# Patient Record
Sex: FEMALE | Race: WHITE | NOT HISPANIC OR LATINO | URBAN - METROPOLITAN AREA
[De-identification: names, ages, dates, MRNs, and addresses within clinical notes are randomized per-mention and may not be internally consistent; named-entity substitution may affect disease eponyms.]

---

## 2017-10-04 ENCOUNTER — OUTPATIENT (OUTPATIENT)
Dept: OUTPATIENT SERVICES | Facility: HOSPITAL | Age: 52
LOS: 1 days | End: 2017-10-04
Payer: COMMERCIAL

## 2017-10-04 ENCOUNTER — APPOINTMENT (OUTPATIENT)
Dept: MAMMOGRAPHY | Facility: IMAGING CENTER | Age: 52
End: 2017-10-04
Payer: COMMERCIAL

## 2017-10-04 DIAGNOSIS — Z00.8 ENCOUNTER FOR OTHER GENERAL EXAMINATION: ICD-10-CM

## 2017-10-04 DIAGNOSIS — Z90.710 ACQUIRED ABSENCE OF BOTH CERVIX AND UTERUS: Chronic | ICD-10-CM

## 2017-10-04 PROCEDURE — 77067 SCR MAMMO BI INCL CAD: CPT

## 2017-10-04 PROCEDURE — 77063 BREAST TOMOSYNTHESIS BI: CPT

## 2017-10-04 PROCEDURE — G0202: CPT | Mod: 26

## 2017-10-04 PROCEDURE — 77063 BREAST TOMOSYNTHESIS BI: CPT | Mod: 26

## 2019-07-08 ENCOUNTER — OUTPATIENT (OUTPATIENT)
Dept: OUTPATIENT SERVICES | Facility: HOSPITAL | Age: 54
LOS: 1 days | End: 2019-07-08
Payer: COMMERCIAL

## 2019-07-08 ENCOUNTER — APPOINTMENT (OUTPATIENT)
Dept: ULTRASOUND IMAGING | Facility: IMAGING CENTER | Age: 54
End: 2019-07-08
Payer: COMMERCIAL

## 2019-07-08 ENCOUNTER — APPOINTMENT (OUTPATIENT)
Dept: RADIOLOGY | Facility: IMAGING CENTER | Age: 54
End: 2019-07-08
Payer: COMMERCIAL

## 2019-07-08 ENCOUNTER — APPOINTMENT (OUTPATIENT)
Dept: MAMMOGRAPHY | Facility: IMAGING CENTER | Age: 54
End: 2019-07-08
Payer: COMMERCIAL

## 2019-07-08 DIAGNOSIS — Z90.710 ACQUIRED ABSENCE OF BOTH CERVIX AND UTERUS: Chronic | ICD-10-CM

## 2019-07-08 DIAGNOSIS — Z00.8 ENCOUNTER FOR OTHER GENERAL EXAMINATION: ICD-10-CM

## 2019-07-08 PROCEDURE — 76536 US EXAM OF HEAD AND NECK: CPT | Mod: 26

## 2019-07-08 PROCEDURE — 77063 BREAST TOMOSYNTHESIS BI: CPT

## 2019-07-08 PROCEDURE — 77063 BREAST TOMOSYNTHESIS BI: CPT | Mod: 26

## 2019-07-08 PROCEDURE — 77080 DXA BONE DENSITY AXIAL: CPT | Mod: 26

## 2019-07-08 PROCEDURE — 77067 SCR MAMMO BI INCL CAD: CPT | Mod: 26

## 2019-07-08 PROCEDURE — 77080 DXA BONE DENSITY AXIAL: CPT

## 2019-07-08 PROCEDURE — 77067 SCR MAMMO BI INCL CAD: CPT

## 2019-07-08 PROCEDURE — 76536 US EXAM OF HEAD AND NECK: CPT

## 2020-04-29 ENCOUNTER — INPATIENT (INPATIENT)
Facility: HOSPITAL | Age: 55
LOS: 4 days | Discharge: HOME | End: 2020-05-04
Attending: HOSPITALIST
Payer: COMMERCIAL

## 2020-04-29 VITALS
TEMPERATURE: 99 F | SYSTOLIC BLOOD PRESSURE: 147 MMHG | RESPIRATION RATE: 19 BRPM | DIASTOLIC BLOOD PRESSURE: 79 MMHG | HEART RATE: 119 BPM | OXYGEN SATURATION: 98 %

## 2020-04-29 DIAGNOSIS — F29 UNSPECIFIED PSYCHOSIS NOT DUE TO A SUBSTANCE OR KNOWN PHYSIOLOGICAL CONDITION: ICD-10-CM

## 2020-04-29 DIAGNOSIS — N39.0 URINARY TRACT INFECTION, SITE NOT SPECIFIED: ICD-10-CM

## 2020-04-29 DIAGNOSIS — E11.9 TYPE 2 DIABETES MELLITUS WITHOUT COMPLICATIONS: ICD-10-CM

## 2020-04-29 DIAGNOSIS — Z90.710 ACQUIRED ABSENCE OF BOTH CERVIX AND UTERUS: Chronic | ICD-10-CM

## 2020-04-29 DIAGNOSIS — Z02.9 ENCOUNTER FOR ADMINISTRATIVE EXAMINATIONS, UNSPECIFIED: ICD-10-CM

## 2020-04-29 DIAGNOSIS — E78.5 HYPERLIPIDEMIA, UNSPECIFIED: ICD-10-CM

## 2020-04-29 DIAGNOSIS — N17.9 ACUTE KIDNEY FAILURE, UNSPECIFIED: ICD-10-CM

## 2020-04-29 DIAGNOSIS — I10 ESSENTIAL (PRIMARY) HYPERTENSION: ICD-10-CM

## 2020-04-29 DIAGNOSIS — Z29.9 ENCOUNTER FOR PROPHYLACTIC MEASURES, UNSPECIFIED: ICD-10-CM

## 2020-04-29 LAB
ALBUMIN SERPL ELPH-MCNC: 4.7 G/DL — SIGNIFICANT CHANGE UP (ref 3.3–5)
ALP SERPL-CCNC: 65 U/L — SIGNIFICANT CHANGE UP (ref 40–120)
ALT FLD-CCNC: 35 U/L — HIGH (ref 4–33)
AMPHET UR-MCNC: NEGATIVE — SIGNIFICANT CHANGE UP
ANION GAP SERPL CALC-SCNC: 18 MMO/L — HIGH (ref 7–14)
APAP SERPL-MCNC: < 15 UG/ML — LOW (ref 15–25)
APPEARANCE UR: CLEAR — SIGNIFICANT CHANGE UP
AST SERPL-CCNC: 24 U/L — SIGNIFICANT CHANGE UP (ref 4–32)
BACTERIA # UR AUTO: SIGNIFICANT CHANGE UP
BARBITURATES UR SCN-MCNC: NEGATIVE — SIGNIFICANT CHANGE UP
BASOPHILS # BLD AUTO: 0.12 K/UL — SIGNIFICANT CHANGE UP (ref 0–0.2)
BASOPHILS NFR BLD AUTO: 0.7 % — SIGNIFICANT CHANGE UP (ref 0–2)
BENZODIAZ UR-MCNC: NEGATIVE — SIGNIFICANT CHANGE UP
BILIRUB SERPL-MCNC: 0.6 MG/DL — SIGNIFICANT CHANGE UP (ref 0.2–1.2)
BILIRUB UR-MCNC: NEGATIVE — SIGNIFICANT CHANGE UP
BLOOD UR QL VISUAL: NEGATIVE — SIGNIFICANT CHANGE UP
BUN SERPL-MCNC: 30 MG/DL — HIGH (ref 7–23)
CALCIUM SERPL-MCNC: 10.5 MG/DL — SIGNIFICANT CHANGE UP (ref 8.4–10.5)
CANNABINOIDS UR-MCNC: NEGATIVE — SIGNIFICANT CHANGE UP
CHLORIDE SERPL-SCNC: 103 MMOL/L — SIGNIFICANT CHANGE UP (ref 98–107)
CO2 SERPL-SCNC: 22 MMOL/L — SIGNIFICANT CHANGE UP (ref 22–31)
COCAINE METAB.OTHER UR-MCNC: NEGATIVE — SIGNIFICANT CHANGE UP
COLOR SPEC: YELLOW — SIGNIFICANT CHANGE UP
CREAT SERPL-MCNC: 1.13 MG/DL — SIGNIFICANT CHANGE UP (ref 0.5–1.3)
EOSINOPHIL # BLD AUTO: 0.14 K/UL — SIGNIFICANT CHANGE UP (ref 0–0.5)
EOSINOPHIL NFR BLD AUTO: 0.8 % — SIGNIFICANT CHANGE UP (ref 0–6)
ETHANOL BLD-MCNC: < 10 MG/DL — SIGNIFICANT CHANGE UP
GLUCOSE SERPL-MCNC: 129 MG/DL — HIGH (ref 70–99)
GLUCOSE UR-MCNC: NEGATIVE — SIGNIFICANT CHANGE UP
HCT VFR BLD CALC: 40.9 % — SIGNIFICANT CHANGE UP (ref 34.5–45)
HGB BLD-MCNC: 13.5 G/DL — SIGNIFICANT CHANGE UP (ref 11.5–15.5)
HYALINE CASTS # UR AUTO: HIGH
IMM GRANULOCYTES NFR BLD AUTO: 0.6 % — SIGNIFICANT CHANGE UP (ref 0–1.5)
KETONES UR-MCNC: NEGATIVE — SIGNIFICANT CHANGE UP
LEUKOCYTE ESTERASE UR-ACNC: SIGNIFICANT CHANGE UP
LYMPHOCYTES # BLD AUTO: 16.7 % — SIGNIFICANT CHANGE UP (ref 13–44)
LYMPHOCYTES # BLD AUTO: 2.8 K/UL — SIGNIFICANT CHANGE UP (ref 1–3.3)
MCHC RBC-ENTMCNC: 29.5 PG — SIGNIFICANT CHANGE UP (ref 27–34)
MCHC RBC-ENTMCNC: 33 % — SIGNIFICANT CHANGE UP (ref 32–36)
MCV RBC AUTO: 89.5 FL — SIGNIFICANT CHANGE UP (ref 80–100)
METHADONE UR-MCNC: NEGATIVE — SIGNIFICANT CHANGE UP
MONOCYTES # BLD AUTO: 1.57 K/UL — HIGH (ref 0–0.9)
MONOCYTES NFR BLD AUTO: 9.3 % — SIGNIFICANT CHANGE UP (ref 2–14)
NEUTROPHILS # BLD AUTO: 12.08 K/UL — HIGH (ref 1.8–7.4)
NEUTROPHILS NFR BLD AUTO: 71.9 % — SIGNIFICANT CHANGE UP (ref 43–77)
NITRITE UR-MCNC: NEGATIVE — SIGNIFICANT CHANGE UP
NRBC # FLD: 0 K/UL — SIGNIFICANT CHANGE UP (ref 0–0)
OPIATES UR-MCNC: NEGATIVE — SIGNIFICANT CHANGE UP
OXYCODONE UR-MCNC: NEGATIVE — SIGNIFICANT CHANGE UP
PCP UR-MCNC: NEGATIVE — SIGNIFICANT CHANGE UP
PH UR: 5.5 — SIGNIFICANT CHANGE UP (ref 5–8)
PLATELET # BLD AUTO: 365 K/UL — SIGNIFICANT CHANGE UP (ref 150–400)
PMV BLD: 9.2 FL — SIGNIFICANT CHANGE UP (ref 7–13)
POTASSIUM SERPL-MCNC: 3.4 MMOL/L — LOW (ref 3.5–5.3)
POTASSIUM SERPL-SCNC: 3.4 MMOL/L — LOW (ref 3.5–5.3)
PROT SERPL-MCNC: 8.2 G/DL — SIGNIFICANT CHANGE UP (ref 6–8.3)
PROT UR-MCNC: 50 — SIGNIFICANT CHANGE UP
RBC # BLD: 4.57 M/UL — SIGNIFICANT CHANGE UP (ref 3.8–5.2)
RBC # FLD: 13.5 % — SIGNIFICANT CHANGE UP (ref 10.3–14.5)
RBC CASTS # UR COMP ASSIST: SIGNIFICANT CHANGE UP (ref 0–?)
SALICYLATES SERPL-MCNC: < 5 MG/DL — LOW (ref 15–30)
SODIUM SERPL-SCNC: 143 MMOL/L — SIGNIFICANT CHANGE UP (ref 135–145)
SP GR SPEC: 1.03 — SIGNIFICANT CHANGE UP (ref 1–1.04)
SQUAMOUS # UR AUTO: SIGNIFICANT CHANGE UP
TSH SERPL-MCNC: 0.52 UIU/ML — SIGNIFICANT CHANGE UP (ref 0.27–4.2)
UROBILINOGEN FLD QL: NORMAL — SIGNIFICANT CHANGE UP
WBC # BLD: 16.81 K/UL — HIGH (ref 3.8–10.5)
WBC # FLD AUTO: 16.81 K/UL — HIGH (ref 3.8–10.5)
WBC UR QL: SIGNIFICANT CHANGE UP (ref 0–?)

## 2020-04-29 PROCEDURE — 99285 EMERGENCY DEPT VISIT HI MDM: CPT

## 2020-04-29 PROCEDURE — 70450 CT HEAD/BRAIN W/O DYE: CPT | Mod: 26

## 2020-04-29 PROCEDURE — 99233 SBSQ HOSP IP/OBS HIGH 50: CPT

## 2020-04-29 RX ORDER — DEXTROSE 50 % IN WATER 50 %
15 SYRINGE (ML) INTRAVENOUS ONCE
Refills: 0 | Status: DISCONTINUED | OUTPATIENT
Start: 2020-04-29 | End: 2020-05-04

## 2020-04-29 RX ORDER — AMLODIPINE BESYLATE 2.5 MG/1
1 TABLET ORAL
Qty: 0 | Refills: 0 | DISCHARGE

## 2020-04-29 RX ORDER — QUETIAPINE FUMARATE 200 MG/1
25 TABLET, FILM COATED ORAL AT BEDTIME
Refills: 0 | Status: DISCONTINUED | OUTPATIENT
Start: 2020-04-29 | End: 2020-04-30

## 2020-04-29 RX ORDER — METFORMIN HYDROCHLORIDE 850 MG/1
1 TABLET ORAL
Qty: 0 | Refills: 0 | DISCHARGE

## 2020-04-29 RX ORDER — GLUCAGON INJECTION, SOLUTION 0.5 MG/.1ML
1 INJECTION, SOLUTION SUBCUTANEOUS ONCE
Refills: 0 | Status: DISCONTINUED | OUTPATIENT
Start: 2020-04-29 | End: 2020-05-04

## 2020-04-29 RX ORDER — ATORVASTATIN CALCIUM 80 MG/1
10 TABLET, FILM COATED ORAL AT BEDTIME
Refills: 0 | Status: DISCONTINUED | OUTPATIENT
Start: 2020-04-29 | End: 2020-05-04

## 2020-04-29 RX ORDER — DEXTROSE 50 % IN WATER 50 %
25 SYRINGE (ML) INTRAVENOUS ONCE
Refills: 0 | Status: DISCONTINUED | OUTPATIENT
Start: 2020-04-29 | End: 2020-05-04

## 2020-04-29 RX ORDER — ATORVASTATIN CALCIUM 80 MG/1
10 TABLET, FILM COATED ORAL
Qty: 0 | Refills: 0 | DISCHARGE

## 2020-04-29 RX ORDER — SODIUM CHLORIDE 9 MG/ML
1000 INJECTION, SOLUTION INTRAVENOUS
Refills: 0 | Status: DISCONTINUED | OUTPATIENT
Start: 2020-04-29 | End: 2020-05-04

## 2020-04-29 RX ORDER — ENOXAPARIN SODIUM 100 MG/ML
40 INJECTION SUBCUTANEOUS DAILY
Refills: 0 | Status: DISCONTINUED | OUTPATIENT
Start: 2020-04-29 | End: 2020-05-04

## 2020-04-29 RX ORDER — DEXTROSE 50 % IN WATER 50 %
12.5 SYRINGE (ML) INTRAVENOUS ONCE
Refills: 0 | Status: DISCONTINUED | OUTPATIENT
Start: 2020-04-29 | End: 2020-05-04

## 2020-04-29 RX ORDER — INSULIN LISPRO 100/ML
VIAL (ML) SUBCUTANEOUS
Refills: 0 | Status: DISCONTINUED | OUTPATIENT
Start: 2020-04-29 | End: 2020-05-04

## 2020-04-29 RX ORDER — LOSARTAN POTASSIUM 100 MG/1
1 TABLET, FILM COATED ORAL
Qty: 0 | Refills: 0 | DISCHARGE

## 2020-04-29 RX ORDER — CEFTRIAXONE 500 MG/1
1000 INJECTION, POWDER, FOR SOLUTION INTRAMUSCULAR; INTRAVENOUS ONCE
Refills: 0 | Status: COMPLETED | OUTPATIENT
Start: 2020-04-29 | End: 2020-04-29

## 2020-04-29 RX ORDER — ACETAMINOPHEN 500 MG
650 TABLET ORAL EVERY 6 HOURS
Refills: 0 | Status: DISCONTINUED | OUTPATIENT
Start: 2020-04-29 | End: 2020-05-04

## 2020-04-29 RX ORDER — HALOPERIDOL DECANOATE 100 MG/ML
5 INJECTION INTRAMUSCULAR EVERY 6 HOURS
Refills: 0 | Status: DISCONTINUED | OUTPATIENT
Start: 2020-04-29 | End: 2020-05-04

## 2020-04-29 RX ORDER — CEFTRIAXONE 500 MG/1
1000 INJECTION, POWDER, FOR SOLUTION INTRAMUSCULAR; INTRAVENOUS ONCE
Refills: 0 | Status: COMPLETED | OUTPATIENT
Start: 2020-04-29 | End: 2020-04-30

## 2020-04-29 RX ADMIN — CEFTRIAXONE 100 MILLIGRAM(S): 500 INJECTION, POWDER, FOR SOLUTION INTRAMUSCULAR; INTRAVENOUS at 19:56

## 2020-04-29 RX ADMIN — Medication 2 MILLIGRAM(S): at 19:56

## 2020-04-29 RX ADMIN — Medication 30 MILLILITER(S): at 19:56

## 2020-04-29 RX ADMIN — Medication 650 MILLIGRAM(S): at 22:01

## 2020-04-29 RX ADMIN — Medication 2 MILLIGRAM(S): at 21:57

## 2020-04-29 RX ADMIN — HALOPERIDOL DECANOATE 5 MILLIGRAM(S): 100 INJECTION INTRAMUSCULAR at 22:01
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
## 2020-04-29 NOTE — H&P ADULT - NSHPLABSRESULTS_GEN_ALL_CORE
Personally reviewed labs.   Personally reviewed imaging.   Personally reviewed EKG.                           13.5   16.81 )-----------( 365      ( 2020 18:30 )             40.9           143  |  103  |  30<H>  ----------------------------<  129<H>  3.4<L>   |  22  |  1.13    Ca    10.5      2020 18:30    TPro  8.2  /  Alb  4.7  /  TBili  0.6  /  DBili  x   /  AST  24  /  ALT  35<H>  /  AlkPhos  65              LIVER FUNCTIONS - ( 2020 18:30 )  Alb: 4.7 g/dL / Pro: 8.2 g/dL / ALK PHOS: 65 u/L / ALT: 35 u/L / AST: 24 u/L / GGT: x                 Urinalysis Basic - ( 2020 18:30 )    Color: YELLOW / Appearance: CLEAR / S.028 / pH: 5.5  Gluc: NEGATIVE / Ketone: NEGATIVE  / Bili: NEGATIVE / Urobili: NORMAL   Blood: NEGATIVE / Protein: 50 / Nitrite: NEGATIVE   Leuk Esterase: LARGE / RBC: 0-2 / WBC 25-50   Sq Epi: FEW / Non Sq Epi: x / Bacteria: FEW Personally reviewed labs.   Personally reviewed imaging.   Personally reviewed EKG.     EKG                        13.5   16.81 )-----------( 365      ( 2020 18:30 )             40.9           143  |  103  |  30<H>  ----------------------------<  129<H>  3.4<L>   |  22  |  1.13    Ca    10.5      2020 18:30    TPro  8.2  /  Alb  4.7  /  TBili  0.6  /  DBili  x   /  AST  24  /  ALT  35<H>  /  AlkPhos  65              LIVER FUNCTIONS - ( 2020 18:30 )  Alb: 4.7 g/dL / Pro: 8.2 g/dL / ALK PHOS: 65 u/L / ALT: 35 u/L / AST: 24 u/L / GGT: x                 Urinalysis Basic - ( 2020 18:30 )    Color: YELLOW / Appearance: CLEAR / S.028 / pH: 5.5  Gluc: NEGATIVE / Ketone: NEGATIVE  / Bili: NEGATIVE / Urobili: NORMAL   Blood: NEGATIVE / Protein: 50 / Nitrite: NEGATIVE   Leuk Esterase: LARGE / RBC: 0-2 / WBC 25-50   Sq Epi: FEW / Non Sq Epi: x / Bacteria: FEW Personally reviewed labs.   Personally reviewed imaging.   Personally reviewed EKG.     EKG: Sinus tachycardia 119,                         13.5   16.81 )-----------( 365      ( 2020 18:30 )             40.9           143  |  103  |  30<H>  ----------------------------<  129<H>  3.4<L>   |  22  |  1.13    Ca    10.5      2020 18:30    TPro  8.2  /  Alb  4.7  /  TBili  0.6  /  DBili  x   /  AST  24  /  ALT  35<H>  /  AlkPhos  65              LIVER FUNCTIONS - ( 2020 18:30 )  Alb: 4.7 g/dL / Pro: 8.2 g/dL / ALK PHOS: 65 u/L / ALT: 35 u/L / AST: 24 u/L / GGT: x             Urinalysis Basic - ( 2020 18:30 )    Color: YELLOW / Appearance: CLEAR / S.028 / pH: 5.5  Gluc: NEGATIVE / Ketone: NEGATIVE  / Bili: NEGATIVE / Urobili: NORMAL   Blood: NEGATIVE / Protein: 50 / Nitrite: NEGATIVE   Leuk Esterase: LARGE / RBC: 0-2 / WBC 25-50   Sq Epi: FEW / Non Sq Epi: x / Bacteria: FEW  < from: CT Head No Cont (20 @ 20:45) >      EXAM:  CT BRAIN        PROCEDURE DATE:  2020     IMPRESSION:     No acute intracranial bleeding, mass effect, or shift.        < end of copied text > Personally reviewed labs.   Personally reviewed imaging.   Personally reviewed EKG.     EKG: Sinus tachycardia 119,                         13.5   16.81 )-----------( 365      ( 2020 18:30 )             40.9           143  |  103  |  30<H>  ----------------------------<  129<H>  3.4<L>   |  22  |  1.13    Ca    10.5      2020 18:30    TPro  8.2  /  Alb  4.7  /  TBili  0.6  /  DBili  x   /  AST  24  /  ALT  35<H>  /  AlkPhos  65          LIVER FUNCTIONS - ( 2020 18:30 )  Alb: 4.7 g/dL / Pro: 8.2 g/dL / ALK PHOS: 65 u/L / ALT: 35 u/L / AST: 24 u/L / GGT: x             Urinalysis Basic - ( 2020 18:30 )    Color: YELLOW / Appearance: CLEAR / S.028 / pH: 5.5  Gluc: NEGATIVE / Ketone: NEGATIVE  / Bili: NEGATIVE / Urobili: NORMAL   Blood: NEGATIVE / Protein: 50 / Nitrite: NEGATIVE   Leuk Esterase: LARGE / RBC: 0-2 / WBC 25-50   Sq Epi: FEW / Non Sq Epi: x / Bacteria: FEW  < from: CT Head No Cont (20 @ 20:45) >      EXAM:  CT BRAIN        PROCEDURE DATE:  2020     IMPRESSION:     No acute intracranial bleeding, mass effect, or shift.        < end of copied text >

## 2020-04-29 NOTE — ED BEHAVIORAL HEALTH ASSESSMENT NOTE - RISK ASSESSMENT
Acute risk factors: mood episode, agitation, insomnia, aggressive behavior, impulsivity, recent discontinuation of medication  Chronic risk: prior psych hx, non-compliance/not in treatment, lacks insight, poor judgment  Protective: social supports, no prior history of suicide attempts or violence, no legal history, no substance abuse, , female gender Low Acute Suicide Risk
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
## 2020-04-29 NOTE — H&P ADULT - PROBLEM SELECTOR PLAN 5
On metformin 500 daily at home  ISS while hospitalized  A1C -c/w Amlodipine  -hold losartan in setting of DALJIT
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
## 2020-04-29 NOTE — ED ADULT NURSE NOTE - ED STAT RN HANDOFF DETAILS
endorsed to essu 1 argenis banda. pt awake, alert, verbal and responsive to all stimuli. pt currently at ct scan with 1:1 in place. nad noted.
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
## 2020-04-29 NOTE — H&P ADULT - NSHPPHYSICALEXAM_GEN_ALL_CORE
Vital Signs Last 24 Hrs  T(C): 37 (29 Apr 2020 18:06), Max: 37 (29 Apr 2020 18:06)  T(F): 98.6 (29 Apr 2020 18:06), Max: 98.6 (29 Apr 2020 18:06)  HR: 119 (29 Apr 2020 18:06) (119 - 119)  BP: 147/79 (29 Apr 2020 18:06) (147/79 - 147/79)  BP(mean): --  RR: 19 (29 Apr 2020 18:06) (19 - 19)  SpO2: 98% (29 Apr 2020 18:06) (98% - 98%) Vital Signs Last 24 Hrs  T(C): 37 (29 Apr 2020 18:06), Max: 37 (29 Apr 2020 18:06)  T(F): 98.6 (29 Apr 2020 18:06), Max: 98.6 (29 Apr 2020 18:06)  HR: 119 (29 Apr 2020 18:06) (119 - 119)  BP: 147/79 (29 Apr 2020 18:06) (147/79 - 147/79)  BP(mean): --  RR: 19 (29 Apr 2020 18:06) (19 - 19)  SpO2: 98% (29 Apr 2020 18:06) (98% - 98%)    PHYSICAL EXAM:  GENERAL: NAD, well-developed  HEAD: Atraumatic, Normocephalic  EYES: EOMI, PERRLA, conjunctiva and sclera clear  NECK: Supple, No JVD  CHEST/LUNG: Clear to auscultation bilaterally; No wheezes/rales/rhonchi  HEART: Tachycardic, regular rhythm; No murmurs, rubs, or gallops  ABDOMEN: Soft, Nontender, Nondistended; Bowel sounds present  EXTREMITIES:  2+ dP pulses b/l, No clubbing, cyanosis, or edema  PSYCH: reactive affect, cooperative, perseverates on not going to back to live with her family  NEUROLOGY: AAOx3, non-focal  SKIN: No rashes or lesions

## 2020-04-29 NOTE — ED PROVIDER NOTE - CLINICAL SUMMARY MEDICAL DECISION MAKING FREE TEXT BOX
56 y/o female pmhx of DM on Metformin, HTN, HLD, s/p hysterectomy, UTI induced psychosis, possible "major depressive disorder, generalized anxiety disorder," stopped Lexapro 1 month ago, hasn't seen therapist in 1 year, presents to ED for bizarre behavior x 3-4 weeks. Pt was tested for COVID antibody +. Daughter states she has had gradual onset of verbal aggression, and depression. States she is manic, took $50,000 out of her bank today and was planning on leaving to [place]. Pt has not been sleeping.  Talking a lot and gets confused and disorganized and anxious. pt calm and cooperative in ED, manic. plan for labs, tsh, ua and cx, tox screen, CT head and psych consult. Cornelius Russell DO: Patient seen during COVID19 pandemic. 54 yo female past medical history DM on Metformin, HTN, HLD, s/p hysterectomy, hx of UTI induced psychosis, possible "major depressive disorder, generalized anxiety disorder," presents to ED for bizarre behavior x 3-4 weeks. Pt was tested for COVID antibody +. stopped Lexapro 1 month ago, hasn't seen therapist in 1 year, Daughter ([facility] nurse) states she has had gradual onset of verbal aggression, and depression. States she is manic, took $50,000 out of her bank today and was planning on leaving to [place]. Pt has not been sleeping.  Talking a lot and gets confused and disorganized and anxious. In ED pt calm, but requiring frequent verbal redirection. exam as above. Ddx includes, but not limited to, new tina/psychosis, will assess for uti given hx. plan: labs, CT head and psych consult.

## 2020-04-29 NOTE — ED BEHAVIORAL HEALTH ASSESSMENT NOTE - VIOLENCE RISK FACTORS:
Affective dysregulation/Irritability/Elopement history or risk/Lack of insight into violence risk/need for treatment/Community stressors that increase the risk of destabilization/Impulsivity/Noncompliance with treatment

## 2020-04-29 NOTE — H&P ADULT - NSHPSOCIALHISTORY_GEN_ALL_CORE
Lives at home with  and two children Lives at home with  and two children  Non-smoker  No illicit drug use

## 2020-04-29 NOTE — H&P ADULT - PROBLEM SELECTOR PLAN 4
-c/w Amlodipine  -hold losartan in setting of DALJIT Patient with COVID symptoms 1 month ago, and positive antibody test 1 week ago  -f/u COVID PCR

## 2020-04-29 NOTE — H&P ADULT - PROBLEM SELECTOR PLAN 1
Patient with psychosis, multifactorial 2/2 psych med discontinuation, infection (UTI, ?COVID)  -CTH pending, Follow up   -TSH wnl   -UA concerning for UTI, UCx pending   -patient received 2mg Ativan in the ED  -Evaluated by psych, patient started on Ativan and Haldol PRN and Seroquel 25 qhs Patient with psychosis, multifactorial 2/2 psych med discontinuation, infection (UTI, ?COVID)  -CTH without acute changes   -TSH wnl , drug screen neg  -UA concerning for UTI, UCx pending   -patient received 2mg Ativan in the ED  -Evaluated by psych, patient started on Ativan and Haldol PRN and Seroquel 25 qhs  -F/ U psych recs Patient with psychosis, multifactorial 2/2 psych med discontinuation, infection (UTI, ?COVID)  -CTH without acute changes   -TSH wnl , drug screen neg  -UA concerning for UTI, UCx pending   -patient received 2mg Ativan in the ED  -Evaluated by psych, patient started on Ativan and Haldol PRN and Seroquel 25 qhs. QTc on admission 464 ms. Monitor QTc with serial EKGs.  -F/ U psych recs

## 2020-04-29 NOTE — ED BEHAVIORAL HEALTH ASSESSMENT NOTE - OTHER PAST PSYCHIATRIC HISTORY (INCLUDE DETAILS REGARDING ONSET, COURSE OF ILLNESS, INPATIENT/OUTPATIENT TREATMENT)
History of recurrent depression, SHANNON, hx one manic episode 1.5 years ago, no psychiatric hospitalization, hx UTI-related psychosis in 2016, prior to which had no psychiatric history. Was last in outpatient treatment over the summer at [facility] stopped in September, had been taking Lexapro up until 1 month ago

## 2020-04-29 NOTE — ED BEHAVIORAL HEALTH ASSESSMENT NOTE - SUICIDE RISK FACTORS
Agitation/Severe Anxiety/Panic/Unable to engage in safety planning/Insomnia/Psychotic disorder current/past/Mood Disorder current/past/Impulsivity/Current mood episode

## 2020-04-29 NOTE — ED BEHAVIORAL HEALTH ASSESSMENT NOTE - DESCRIPTION
HLD, HTN, pre-diabetes,osteopenia, hysterectomy born in [place], grew up in [place], graduated high school, some college,  thirty years, has one son and one daughter Arrived agitated, bit  on way over. Was restless and pacing but was not aggressive toward staff in ER.     Vital Signs Last 24 Hrs  T(C): 37 (29 Apr 2020 18:06), Max: 37 (29 Apr 2020 18:06)  T(F): 98.6 (29 Apr 2020 18:06), Max: 98.6 (29 Apr 2020 18:06)  HR: 119 (29 Apr 2020 18:06) (119 - 119)  BP: 147/79 (29 Apr 2020 18:06) (147/79 - 147/79)  BP(mean): --  RR: 19 (29 Apr 2020 18:06) (19 - 19)  SpO2: 98% (29 Apr 2020 18:06) (98% - 98%)
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
## 2020-04-29 NOTE — H&P ADULT - NSICDXPASTMEDICALHX_GEN_ALL_CORE_FT
PAST MEDICAL HISTORY:  Diabetes     SHANNON (generalized anxiety disorder)     Hyperlipemia     Hypertension     MDD (major depressive disorder)     Organic psychosis

## 2020-04-29 NOTE — ED ADULT NURSE NOTE - OBJECTIVE STATEMENT
Received pt to room 13 A&Ox4 noted to be hyperverbal on assessment but redirectable just keeps repeating "I just want an amicable divorce and half the money is mine". Denies drug or alcohol use, Denies SI/HI, denies medical complaints. Respirations even and unlabored, CO maintained for safety, will continue to monitor.

## 2020-04-29 NOTE — ED BEHAVIORAL HEALTH ASSESSMENT NOTE - SUMMARY
55 year old  female living with  and two children, prior psychiatric history of mood disorder, recurrent depression, one manic episode 1.5 years ago, history of UTI related psychosis in 2016, short courses of outpatient treatment, last saw a therapist at [facility] September 2019 while on Lexapro, recent COVID+ a month ago, was brought in by EMS Called by family for erratic behavior in setting of multiple stressors and stopping Lexapro a month ago.    Patient with increasingly erratic behavior at home including taking out $50,000 from bank with plan to run away. Stressors include pandemic, mother's worsening Alzheimer's dementia and family struggles in addition to discontinuation of medication. She is showing signs of mixed tina, agitation, aggression, impulsivity, insomnia, increase in goal-oriented behavior and is preoccupied with wanting to divorce . She is also disorganized and confused at times with poor attention/concentration and was found to have UTI- this could be similar to prior episode in 2016 of a delirium picture on top of underlying exacerbated psychiatric condition due to the UTI. Patient to be admitted to medicine for treatment of UTI and CL will follow, likely will require admission.

## 2020-04-29 NOTE — ED PROVIDER NOTE - CONSTITUTIONAL, MLM
normal... Well appearing, awake, alert, oriented to person, place, time/situation and in no apparent distress. Calm and cooperative.

## 2020-04-29 NOTE — H&P ADULT - NSHPSOURCEINFOTX_GEN_ALL_CORE
Luann Vu, at [phone] or cell #[phone]. Daughter is an RN on unit L4 at [employer].

## 2020-04-29 NOTE — H&P ADULT - PROBLEM SELECTOR PLAN 2
Pt with UA showing bacteria, and LE. Patient denied symptoms but also with leukocytosis. Also with hx of psychosis in the setting of UTI in the past, probable similar occurrence on this admission   -ceftriaxone in the ED, will continue to complete 5 day course  -f/u Ucx

## 2020-04-29 NOTE — H&P ADULT - PROBLEM SELECTOR PLAN 8
Transitions of Care Status:  1.  Name of PCP:  2.  PCP Contacted on Admission: [ ] Y    [ ] N    3.  PCP contacted at Discharge: [ ] Y    [ ] N    [ ] N/A  4.  Post-Discharge Appointment Date and Location:  5.  Summary of Handoff given to PCP: DVT ppx: Lovenox SQ  Diet: DASH/TLC/ CCH

## 2020-04-30 LAB
CULTURE RESULTS: SIGNIFICANT CHANGE UP
SARS-COV-2 RNA SPEC QL NAA+PROBE: DETECTED
SPECIMEN SOURCE: SIGNIFICANT CHANGE UP

## 2020-04-30 PROCEDURE — 99233 SBSQ HOSP IP/OBS HIGH 50: CPT

## 2020-04-30 RX ORDER — CEFTRIAXONE 500 MG/1
1000 INJECTION, POWDER, FOR SOLUTION INTRAMUSCULAR; INTRAVENOUS EVERY 24 HOURS
Refills: 0 | Status: DISCONTINUED | OUTPATIENT
Start: 2020-05-01 | End: 2020-05-01

## 2020-04-30 RX ORDER — QUETIAPINE FUMARATE 200 MG/1
50 TABLET, FILM COATED ORAL AT BEDTIME
Refills: 0 | Status: DISCONTINUED | OUTPATIENT
Start: 2020-04-30 | End: 2020-05-02

## 2020-04-30 RX ADMIN — ATORVASTATIN CALCIUM 10 MILLIGRAM(S): 80 TABLET, FILM COATED ORAL at 23:08

## 2020-04-30 RX ADMIN — QUETIAPINE FUMARATE 50 MILLIGRAM(S): 200 TABLET, FILM COATED ORAL at 23:08

## 2020-04-30 RX ADMIN — ENOXAPARIN SODIUM 40 MILLIGRAM(S): 100 INJECTION SUBCUTANEOUS at 19:00

## 2020-04-30 RX ADMIN — CEFTRIAXONE 100 MILLIGRAM(S): 500 INJECTION, POWDER, FOR SOLUTION INTRAMUSCULAR; INTRAVENOUS at 04:22

## 2020-04-30 NOTE — PROGRESS NOTE BEHAVIORAL HEALTH - PRN MEDS
Ativan 2mg IV, Haldol 5mg IM PRN for agitation, Ativan 2mg PO, Ativan 2mg IV, Haldol 5mg IM PRN for agitation

## 2020-04-30 NOTE — PROGRESS NOTE ADULT - PROBLEM SELECTOR PLAN 1
Patient with psychosis, multifactorial 2/2 psych med discontinuation, infection (UTI, ?COVID);  - CTH without acute changes   - TSH wnl, Tox Screen Negatve, f/u Vitamin B12 and Folate  - UA concerning for UTI, UCx pending   - Psych following appreciate recs.  - Seroquel 50 mg qhs  - Haldol and Ativan prn per psych
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
## 2020-04-30 NOTE — PROGRESS NOTE BEHAVIORAL HEALTH - SUMMARY
55 year old  female living with  and two children, prior psychiatric history of mood disorder, recurrent depression, one manic episode 1.5 years ago, history of UTI related psychosis in 2016, short courses of outpatient treatment, last saw a therapist at [facility] September 2019 while on Lexapro, recent COVID+ a month ago, was brought in by EMS Called by family for erratic behavior in setting of multiple stressors and stopping Lexapro a month ago.    Patient with increasingly erratic behavior at home including taking out $50,000 from bank with plan to run away. Stressors include pandemic, mother's worsening Alzheimer's dementia and family struggles in addition to discontinuation of medication. She is showing signs of mixed tina, agitation, aggression, impulsivity, insomnia, increase in goal-oriented behavior and is preoccupied with wanting to divorce . She is also disorganized and confused at times with poor attention/concentration and was found to have UTI- this could be similar to prior episode in 2016 of a delirium picture on top of underlying exacerbated psychiatric condition due to the UTI. Patient admitted to medicine for treatment of UTI, likely will require admission. 55 year old  female living with  and two children, prior psychiatric history of mood disorder, recurrent depression, one manic episode 1.5 years ago, history of UTI related psychosis in 2016, short courses of outpatient treatment, last saw a therapist at [facility] September 2019 while on Lexapro, recent COVID+ a month ago, was brought in by EMS Called by family for erratic behavior in setting of multiple stressors and stopping Lexapro a month ago.    Patient with increasingly erratic behavior at home including taking out $50,000 from bank with plan to run away. Stressors include pandemic, mother's worsening Alzheimer's dementia and family struggles in addition to discontinuation of medication. She is showing signs of mixed tina, agitation, aggression, impulsivity, insomnia, increase in goal-oriented behavior and is preoccupied with wanting to divorce . She is also disorganized and confused at times with poor attention/concentration and was found to have UTI- this could be similar to prior episode in 2016 of a delirium picture on top of underlying exacerbated psychiatric condition due to the UTI. Patient admitted to medicine for treatment of UTI. After receiving a total of 4mg Ativan and 5mg Haldol overnight, patient is more calm and composed, is able to have linear conversation and is no longer agitated. She seems to be minimizing some of her symptoms. Will likely benefit from inpatient psychiatric hospitalization 55 year old  female living with  and two children, prior psychiatric history of mood disorder, recurrent depression, one manic episode 1.5 years ago, history of UTI related psychosis in 2016, short courses of outpatient treatment, last saw a therapist at [facility] September 2019 while on Lexapro, recent COVID+ a month ago, was brought in by EMS Called by family for erratic behavior in setting of multiple stressors and stopping Lexapro a month ago.    Patient with increasingly erratic behavior at home including taking out $50,000 from bank with plan to run away. Upon initial presentation to ED, showed symptoms consistent with tina, agitation, aggression, impulsivity, insomnia, increase in goal-oriented behavior and is preoccupied with wanting to divorce . Patient was found to have UTI, and was admitted to medicine for treatment of UTI, now on IV antibiotics. Patient received a total of 4mg Ativan and 5mg Haldol overnight for agitation. Today, patient is more calm and composed, is able to have linear conversation and is no longer agitated. She is now more guarded and minimizing her symptoms. Patient has a history of presenting with manic symptoms in the setting of a UTI, which resolved after UTI resolved. This could be similar to prior episode in 2016 of a delirium picture on top of underlying exacerbated psychiatric condition due to the UTI. Patient will likely need psychiatric admission, however will continue to follow patient for now. 55 year old  female living with  and two children, prior psychiatric history of mood disorder, recurrent depression, one manic episode 1.5 years ago, history of UTI related psychosis in 2016, short courses of outpatient treatment, last saw a therapist at [facility] September 2019 while on Lexapro, recent COVID+ a month ago, was brought in by EMS Called by family for erratic behavior in setting of multiple stressors and stopping Lexapro a month ago.    Patient with increasingly erratic behavior at home including taking out $50,000 from bank with plan to run away. Upon initial presentation to ED, showed symptoms consistent with tina, agitation, aggression, impulsivity, insomnia, increase in goal-oriented behavior and is preoccupied with wanting to divorce . Patient was found to have UTI, and was admitted to medicine for treatment of UTI, now on IV antibiotics. Patient received a total of 4mg Ativan and 5mg Haldol overnight for agitation. Today, patient is more calm and composed, is able to have linear conversation and is no longer agitated. She is now more guarded and minimizing her symptoms. Patient has a history of presenting with manic symptoms in the setting of a UTI, which resolved after UTI resolved. This could be similar to prior episode in 2016 of a delirium picture on top of underlying exacerbated psychiatric condition due to the UTI. Patient will likely need psychiatric admission, however will continue to follow patient for now.    DDX; Delirium 2/2 UTI  vs psychosis unspecified vs Bipolar disorder current episode manic    PLAN:  See below  Patient cannot leave AMA.

## 2020-04-30 NOTE — PROGRESS NOTE BEHAVIORAL HEALTH - NSBHCONSULTMEDAGITATION_PSY_A_CORE FT
Haldol 5mg PO/IM/IV PRN q6h for agitation  Ativan 2mg PO/IM/IV q6h PRN for agitation/anxiety Haldol 5mg PO/IM/IV PRN q6h for agitation if qtc <500  Ativan 2mg PO/IM/IV q6h PRN for agitation/anxiety

## 2020-04-30 NOTE — PROGRESS NOTE BEHAVIORAL HEALTH - NSBHFUPINTERVALHXFT_PSY_A_CORE
Overnight, patient receiving Ativan 2mg PO, and later Ativan 2mg IV/haldol 5mg IM for agitation. Patient was started on seroquel 25mg qhs but she refused PO meds. She is currently being treated for UTI with ceftriaxone.     Patient evaluated at bedside this morning. She reports Overnight, patient receiving Ativan 2mg PO, and later Ativan 2mg IV/haldol 5mg IM for agitation. Patient was started on seroquel 25mg qhs but she refused PO meds. She is currently being treated for UTI with ceftriaxone.     Patient evaluated at bedside this morning. Patient is calmly sitting in bed with mask on, and is cooperative with interview. She is alert and orientedx3, understands she is in the hospital with an infection. Patient reports prior to coming to the hospital she had taken out $10,000 from [employer] and went to visit her mother who has Alzheimers and went grocery shopping. She came home, and her  called the police after an argument. Patient reports when the police arrived, she bit one of them, and was subsequently brought to [facility]. Regarding her psychiatric history, she states she used to have depression and saw an outpatient psychiatrist, was on Lexapro 5mg but was taken off in August 2019. She has not had outpatient psychiatric follow up since then. She denies ever having been psychiatrically hospitalized. She reports getting approximately 5-6 hours of sleep per night at home, denies anxiety, denies racing thoughts. She denies having grandiose thoughts of having special talents or super rondon. Denies AVH. She states she is not working, spends time writing in her journal and watching television. Patient reports living at home with her  and two children. She reports having martial issues recently, and wanting to get a divorce. She denies smoking cigarettes, no alcohol or drugs use. She reports sleeping well last night, and feels “very good about myself” this morning. Patient denies suicidal/homicidal ideation, no delusions elicited, no paranoia.   Patient evaluated at bedside this morning. She reports Overnight, patient receiving Ativan 2mg PO, and later Ativan 2mg IV/haldol 5mg IM for agitation. Patient was started on seroquel 25mg qhs but she refused PO meds. She is currently being treated for UTI with ceftriaxone.     Patient evaluated at bedside this morning by Dr. Simpson. Patient seen by myself via video (Zoom call) in order to mitigate infection risk due to COVID.  Patient is calmly sitting in bed with mask on, and is cooperative with interview but is guarded and minimizing symptoms. She is alert and orientedx3, understands she is in the hospital with an infection. Patient reports prior to coming to the hospital she had taken out $10,000 from [employer] and went to visit her mother who has Alzheimers and went grocery shopping. She came home, and her  called the police after an argument. Patient reports when the police arrived, she bit one of them, and was subsequently brought to [facility]. Regarding her psychiatric history, she states she used to have depression and saw an outpatient psychiatrist, was on Lexapro 5mg but was taken off in August 2019. She has not had outpatient psychiatric follow up since then. She denies ever having been psychiatrically hospitalized. She reports getting approximately 5-6 hours of sleep per night at home, denies anxiety, denies racing thoughts. She denies having grandiose thoughts of having special talents or super rondon. Denies AVH. She states she is not working, spends time writing in her journal and watching television. Patient reports living at home with her  and two children. She reports having martial issues recently, and wanting to get a divorce. She denies smoking cigarettes, no alcohol or drugs use. She reports sleeping well last night, and feels “very good about myself” this morning. Patient denies suicidal/homicidal ideation, no delusions elicited, no paranoia.     Collateral from daughter Luann Vu ([phone])  Daughter reports that patient has been decompensating mentally for the last month. She states she was initially very angry and verbally aggressive. Afterwards, she would have bouts of tearfulness and would frequently break down and cry. Over the last 1-1.5 weeks, patient has been presenting with more manic symptoms. She also reports patient was delusional, thinking nobody in the family wanted to help her, that the family was trying to take all of her money away. She would take cabs to different places, spent a lot of money on her credit card, and took $50,000 out of the bank to use to escape to [place] and start a new life. She had even prepared a go-bag to bring with her to [place], with all of her important account papers. The family called the police because when they found out about her intent to leave for [place]. When the police came, the patient tried to bite one of them. Daughter states the mother is at baseline a hyperverbal and excitable person with a bright personality who likes to stay active. She states 5-6 years ago, pt emptied the vault in their house and sold everything inside including family heirlooms, and planned to use the money to escape. She was brought to the hospital, was found to have a UTI, and at that time was started on Risperdal. Patient’s symptoms resolved after her UTI resolved, and patient was eventually taken off of Risperdal. Approximately 2 years ago, the patient was started on Lexapro by her PCP, after she had racked up a very large credit card bill – during this time, patient was more depressed. Daughter reports that since that time, the patient has had intermittent episodes of what she thinks was somewhat manic-like behavior, such as not sleeping well, wanting to leave and escape the home, staying up all night and cooking or cleaning, but reports the family was able to keep things under control. Patient has never had SI/HI, no prior SA, no history of aggression, no AVH. Overnight, patient receiving Ativan 2mg PO, and later Ativan 2mg IV/haldol 5mg IM for agitation. Patient was started on seroquel 25mg qhs but she refused PO meds. She is currently being treated for UTI with ceftriaxone.     Patient evaluated in peson at bedside this morning by Dr. Simpson. Patient seen by myself via video (Zoom call) in order to mitigate infection risk due to COVID.  Patient is calmly sitting in bed with mask on, and is cooperative with interview but is guarded and minimizing symptoms. She is alert and orientedx3, understands she is in the hospital. Patient reports prior to coming to the hospital she had taken out $10,000 from [employer] and went to visit her mother who has Alzheimers and went grocery shopping. She came home, and her  called the police after an argument. Patient reports when the police arrived, she bit one of them, and was subsequently brought to [facility]. Regarding her psychiatric history, she states she used to have depression and saw an outpatient psychiatrist, was on Lexapro 5mg but was taken off in August 2019. She has not had outpatient psychiatric follow up since then. She denies ever having been psychiatrically hospitalized. She reports getting approximately 5-6 hours of sleep per night at home, denies anxiety, denies racing thoughts. She denies having grandiose thoughts of having special talents or super rondon. Denies AVH. She states she is not working, spends time writing in her journal and watching television. Patient reports living at home with her  and two children. She reports having martial issues recently, and wanting to get a divorce. She denies smoking cigarettes, no alcohol or drugs use. She reports sleeping well last night, and feels “very good about myself” this morning. Patient denies suicidal/homicidal ideation, no delusions elicited, no paranoia.     Collateral from daughter Luann Vu ([phone]) (pt. gave consent)  Daughter reports that patient has been decompensating mentally for the last month. She states she was initially very angry and verbally aggressive. Afterwards, she would have bouts of tearfulness and would frequently break down and cry. Over the last 1-1.5 weeks, patient has been presenting with more manic symptoms. She also reports patient was delusional, thinking nobody in the family wanted to help her, that the family was trying to take all of her money away. She would take cabs to different places, spent a lot of money on her credit card, and took $50,000 out of the bank to use to escape to [place] and start a new life. She had even prepared a go-bag to bring with her to [place], with all of her important account papers. The family called the police because when they found out about her intent to leave for [place]. When the police came, the patient tried to bite one of them. Daughter states the mother is at baseline a hyperverbal and excitable person with a bright personality who likes to stay active. She states 5-6 years ago, pt emptied the vault in their house and sold everything inside including family heirlooms, and planned to use the money to escape. She was brought to the hospital, was found to have a UTI, and at that time was started on Risperdal. Patient’s symptoms resolved after her UTI resolved, and patient was eventually taken off of Risperdal. Approximately 2 years ago, the patient was started on Lexapro by her PCP, after she had racked up a very large credit card bill – during this time, patient was more depressed. Daughter reports that since that time, the patient has had intermittent episodes of what she thinks was somewhat manic-like behavior, such as not sleeping well, wanting to leave and escape the home, staying up all night and cooking or cleaning, but reports the family was able to keep things under control. Patient has never had SI/HI, no prior SA, no history of aggression, no AVH.

## 2020-04-30 NOTE — PROGRESS NOTE BEHAVIORAL HEALTH - NSBHCHARTREVIEWLAB_PSY_A_CORE FT
12.4   11.14 )-----------( 314      ( 2020 07:55 )             37.6         143  |  103  |  30<H>  ----------------------------<  129<H>  3.4<L>   |  22  |  1.13    Ca    10.5      2020 18:30    TPro  8.2  /  Alb  4.7  /  TBili  0.6  /  DBili  x   /  AST  24  /  ALT  35<H>  /  AlkPhos  65  29    Urinalysis Basic - ( 2020 18:30 )    Color: YELLOW / Appearance: CLEAR / S.028 / pH: 5.5  Gluc: NEGATIVE / Ketone: NEGATIVE  / Bili: NEGATIVE / Urobili: NORMAL   Blood: NEGATIVE / Protein: 50 / Nitrite: NEGATIVE   Leuk Esterase: LARGE / RBC: 0-2 / WBC 25-50   Sq Epi: FEW / Non Sq Epi: x / Bacteria: FEW      LIVER FUNCTIONS - ( 2020 18:30 )  Alb: 4.7 g/dL / Pro: 8.2 g/dL / ALK PHOS: 65 u/L / ALT: 35 u/L / AST: 24 u/L / GGT: x           TSH 0.52

## 2020-04-30 NOTE — PROGRESS NOTE BEHAVIORAL HEALTH - NSBHCONSULTMEDS_PSY_A_CORE FT
- INCOMPLETE-    - Given patient's current presentation of tina, start patient on mood stabilizer - Continue patient on Increase seroquel to 50mg PO qhs. If patient refuses PO Seroquel tonight, will consider switching to another IV antipsychotic tomorrow.

## 2020-04-30 NOTE — PROGRESS NOTE BEHAVIORAL HEALTH - OTHER
some psychomotor agitation, unable to sit still , keeps removing her mask pacing patient sitting in bed "very good" jose SI and HI, focused on her relationship with he

## 2020-04-30 NOTE — PROGRESS NOTE ADULT - SUBJECTIVE AND OBJECTIVE BOX
FLAVIA SNELL  55y  Female      Subjective:     Patient was admitted overnight and showed signs of agitation requiring Haldol and ativan.  This am, patient states that she feels better. She states that her fevers, anger, and agitation were in the setting of an infection and feels that she can "behave now".      Meds    MEDICATIONS  (STANDING):  atorvastatin 10 milliGRAM(s) Oral at bedtime  dextrose 5%. 1000 milliLiter(s) (50 mL/Hr) IV Continuous <Continuous>  dextrose 50% Injectable 12.5 Gram(s) IV Push once  dextrose 50% Injectable 25 Gram(s) IV Push once  dextrose 50% Injectable 25 Gram(s) IV Push once  enoxaparin Injectable 40 milliGRAM(s) SubCutaneous daily  insulin lispro (HumaLOG) corrective regimen sliding scale   SubCutaneous three times a day before meals  QUEtiapine 50 milliGRAM(s) Oral at bedtime    MEDICATIONS  (PRN):  acetaminophen   Tablet .. 650 milliGRAM(s) Oral every 6 hours PRN Temp greater or equal to 38C (100.4F), Mild Pain (1 - 3), Moderate Pain (4 - 6)  dextrose 40% Gel 15 Gram(s) Oral once PRN Blood Glucose LESS THAN 70 milliGRAM(s)/deciliter  glucagon  Injectable 1 milliGRAM(s) IntraMuscular once PRN Glucose LESS THAN 70 milligrams/deciliter  haloperidol     Tablet 5 milliGRAM(s) Oral every 6 hours PRN anxiety/agitation  haloperidol    Injectable 5 milliGRAM(s) IntraMuscular every 6 hours PRN Agitation  LORazepam     Tablet 2 milliGRAM(s) Oral every 6 hours PRN Anxiety/agitation  LORazepam   Injectable 2 milliGRAM(s) IV Push every 6 hours PRN Anxiety, agitation        Vital Signs Last 24 Hrs  T(C): 36.7 (2020 14:55), Max: 37 (2020 18:06)  T(F): 98 (2020 14:55), Max: 98.6 (2020 18:06)  HR: 93 (2020 14:55) (88 - 119)  BP: 108/51 (2020 14:55) (104/60 - 147/79)  BP(mean): --  RR: 17 (2020 14:55) (17 - 19)  SpO2: 98% (2020 14:55) (98% - 100%)    PHYSICAL EXAM:  GENERAL: NAD, well-groomed, well-developed  NECK: Supple, No JVD  CHEST/LUNG: Clear to auscultation bilaterally; No rales, rhonchi, wheezing  HEART: Regular rate and rhythm; No murmurs, rubs, or gallops  ABDOMEN: Soft, Nontender, Nondistended; Bowel sounds present  EXTREMITIES:  2+ Peripheral Pulses, No clubbing, cyanosis, or edema  NEURO:  No Focal deficits, sensory and motor intact  PSYCH: Denies SI/HI/AVH, aggressive questioning in nature, minimizing symptoms  SKIN: No rashes or lesions    Consultant(s) Notes Reviewed:  [x ] YES  [ ] NO  Care Discussed with Consultants/Other Providers [ x] YES  [ ] NO    LABS:      The Labs were reviewed by me   The Radiology was reviewed by me    EKG tracing reviewed by me        137  |  101  |  22  ----------------------------<  113<H>  4.0   |  24  |  0.88      143  |  103  |  30<H>  ----------------------------<  129<H>  3.4<L>   |  22  |  1.13    Ca    9.8      2020 07:55  Ca    10.5      2020 18:30    TPro  7.2  /  Alb  4.1  /  TBili  0.7  /  DBili  x   /  AST  24  /  ALT  32  /  AlkPhos  60  0430  TPro  8.2  /  Alb  4.7  /  TBili  0.6  /  DBili  x   /  AST  24  /  ALT  35<H>  /  AlkPhos  65  04-29          PT/INR - ( 2020 07:55 )   PT: 12.9 SEC;   INR: 1.13          PTT - ( 2020 07:55 )  PTT:32.6 SEC              Urinalysis Basic - ( 2020 18:30 )    Color: YELLOW / Appearance: CLEAR / S.028 / pH: 5.5  Gluc: NEGATIVE / Ketone: NEGATIVE  / Bili: NEGATIVE / Urobili: NORMAL   Blood: NEGATIVE / Protein: 50 / Nitrite: NEGATIVE   Leuk Esterase: LARGE / RBC: 0-2 / WBC 25-50   Sq Epi: FEW / Non Sq Epi: x / Bacteria: FEW                              12.4   11.14 )-----------( 314      ( 2020 07:55 )             37.6                         13.5   16.81 )-----------( 365      ( 2020 18:30 )             40.9     CAPILLARY BLOOD GLUCOSE      POCT Blood Glucose.: 125 mg/dL (2020 17:18)  POCT Blood Glucose.: 129 mg/dL (2020 12:47)  POCT Blood Glucose.: 128 mg/dL (2020 09:21)          RADIOLOGY & ADDITIONAL TESTS: FLAVIA SNELL  55y  Female      Subjective:     Patient was admitted overnight and showed signs of agitation requiring Haldol and ativan.  This am, patient states that she feels better. She states that her fevers, anger, and agitation were in the setting of an infection and feels that she can "behave now".  No fever/chills, SOB, CP.      Meds    MEDICATIONS  (STANDING):  atorvastatin 10 milliGRAM(s) Oral at bedtime  dextrose 5%. 1000 milliLiter(s) (50 mL/Hr) IV Continuous <Continuous>  dextrose 50% Injectable 12.5 Gram(s) IV Push once  dextrose 50% Injectable 25 Gram(s) IV Push once  dextrose 50% Injectable 25 Gram(s) IV Push once  enoxaparin Injectable 40 milliGRAM(s) SubCutaneous daily  insulin lispro (HumaLOG) corrective regimen sliding scale   SubCutaneous three times a day before meals  QUEtiapine 50 milliGRAM(s) Oral at bedtime    MEDICATIONS  (PRN):  acetaminophen   Tablet .. 650 milliGRAM(s) Oral every 6 hours PRN Temp greater or equal to 38C (100.4F), Mild Pain (1 - 3), Moderate Pain (4 - 6)  dextrose 40% Gel 15 Gram(s) Oral once PRN Blood Glucose LESS THAN 70 milliGRAM(s)/deciliter  glucagon  Injectable 1 milliGRAM(s) IntraMuscular once PRN Glucose LESS THAN 70 milligrams/deciliter  haloperidol     Tablet 5 milliGRAM(s) Oral every 6 hours PRN anxiety/agitation  haloperidol    Injectable 5 milliGRAM(s) IntraMuscular every 6 hours PRN Agitation  LORazepam     Tablet 2 milliGRAM(s) Oral every 6 hours PRN Anxiety/agitation  LORazepam   Injectable 2 milliGRAM(s) IV Push every 6 hours PRN Anxiety, agitation        Vital Signs Last 24 Hrs  T(C): 36.7 (2020 14:55), Max: 37 (2020 18:06)  T(F): 98 (2020 14:55), Max: 98.6 (2020 18:06)  HR: 93 (2020 14:55) (88 - 119)  BP: 108/51 (2020 14:55) (104/60 - 147/79)  BP(mean): --  RR: 17 (2020 14:55) (17 - 19)  SpO2: 98% (2020 14:55) (98% - 100%)    PHYSICAL EXAM:  GENERAL: NAD, well-groomed, well-developed  NECK: Supple, No JVD  CHEST/LUNG: Clear to auscultation bilaterally; No rales, rhonchi, wheezing  HEART: Regular rate and rhythm; No murmurs, rubs, or gallops  ABDOMEN: Soft, Nontender, Nondistended; Bowel sounds present  EXTREMITIES:  2+ Peripheral Pulses, No clubbing, cyanosis, or edema  NEURO:  No Focal deficits, sensory and motor intact  PSYCH: Denies SI/HI/AVH, aggressive questioning in nature, minimizing symptoms  SKIN: No rashes or lesions    Consultant(s) Notes Reviewed:  [x ] YES  [ ] NO  Care Discussed with Consultants/Other Providers [ x] YES  [ ] NO    LABS:      The Labs were reviewed by me   The Radiology was reviewed by me    EKG tracing reviewed by me        137  |  101  |  22  ----------------------------<  113<H>  4.0   |  24  |  0.88      143  |  103  |  30<H>  ----------------------------<  129<H>  3.4<L>   |  22  |  1.13    Ca    9.8      2020 07:55  Ca    10.5      2020 18:30    TPro  7.2  /  Alb  4.1  /  TBili  0.7  /  DBili  x   /  AST  24  /  ALT  32  /  AlkPhos  60  04-30  TPro  8.2  /  Alb  4.7  /  TBili  0.6  /  DBili  x   /  AST  24  /  ALT  35<H>  /  AlkPhos  65  04-29          PT/INR - ( 2020 07:55 )   PT: 12.9 SEC;   INR: 1.13          PTT - ( 2020 07:55 )  PTT:32.6 SEC              Urinalysis Basic - ( 2020 18:30 )    Color: YELLOW / Appearance: CLEAR / S.028 / pH: 5.5  Gluc: NEGATIVE / Ketone: NEGATIVE  / Bili: NEGATIVE / Urobili: NORMAL   Blood: NEGATIVE / Protein: 50 / Nitrite: NEGATIVE   Leuk Esterase: LARGE / RBC: 0-2 / WBC 25-50   Sq Epi: FEW / Non Sq Epi: x / Bacteria: FEW                              12.4   11.14 )-----------( 314      ( 2020 07:55 )             37.6                         13.5   16.81 )-----------( 365      ( 2020 18:30 )             40.9     CAPILLARY BLOOD GLUCOSE      POCT Blood Glucose.: 125 mg/dL (2020 17:18)  POCT Blood Glucose.: 129 mg/dL (2020 12:47)  POCT Blood Glucose.: 128 mg/dL (2020 09:21)          RADIOLOGY & ADDITIONAL TESTS:

## 2020-04-30 NOTE — PROGRESS NOTE ADULT - PROBLEM SELECTOR PLAN 2
Pt with UA showing bacteria, and LE. Patient denied symptoms but also with leukocytosis.   - Ceftriaxone for now for a total of 7 days.  - F/u Ucx

## 2020-04-30 NOTE — PROGRESS NOTE ADULT - ASSESSMENT
55 year old female with major depressive disorder, general anxiety disorder, history of UTI induced psychosis, DM II on metformin, HTN and hyperlipidemia, brought in by EMS for bizarre behavior for the past 3-4 weeks. Admitted for psychosis/ tina in the setting of discontinued psychiatric medication, as well as infection (UTI and ?recent COVID infection).

## 2020-05-01 DIAGNOSIS — R41.0 DISORIENTATION, UNSPECIFIED: ICD-10-CM

## 2020-05-01 DIAGNOSIS — U07.1 COVID-19: ICD-10-CM

## 2020-05-01 DIAGNOSIS — F31.9 BIPOLAR DISORDER, UNSPECIFIED: ICD-10-CM

## 2020-05-01 LAB
ALBUMIN SERPL ELPH-MCNC: 4.1 G/DL — SIGNIFICANT CHANGE UP (ref 3.3–5)
ALP SERPL-CCNC: 58 U/L — SIGNIFICANT CHANGE UP (ref 40–120)
ALT FLD-CCNC: 31 U/L — SIGNIFICANT CHANGE UP (ref 4–33)
ANION GAP SERPL CALC-SCNC: 12 MMO/L — SIGNIFICANT CHANGE UP (ref 7–14)
AST SERPL-CCNC: 21 U/L — SIGNIFICANT CHANGE UP (ref 4–32)
BASOPHILS # BLD AUTO: 0.08 K/UL — SIGNIFICANT CHANGE UP (ref 0–0.2)
BASOPHILS NFR BLD AUTO: 0.8 % — SIGNIFICANT CHANGE UP (ref 0–2)
BILIRUB SERPL-MCNC: 0.5 MG/DL — SIGNIFICANT CHANGE UP (ref 0.2–1.2)
BUN SERPL-MCNC: 25 MG/DL — HIGH (ref 7–23)
CALCIUM SERPL-MCNC: 9.3 MG/DL — SIGNIFICANT CHANGE UP (ref 8.4–10.5)
CHLORIDE SERPL-SCNC: 99 MMOL/L — SIGNIFICANT CHANGE UP (ref 98–107)
CO2 SERPL-SCNC: 23 MMOL/L — SIGNIFICANT CHANGE UP (ref 22–31)
CREAT SERPL-MCNC: 0.9 MG/DL — SIGNIFICANT CHANGE UP (ref 0.5–1.3)
EOSINOPHIL # BLD AUTO: 0.43 K/UL — SIGNIFICANT CHANGE UP (ref 0–0.5)
EOSINOPHIL NFR BLD AUTO: 4.4 % — SIGNIFICANT CHANGE UP (ref 0–6)
FOLATE SERPL-MCNC: 13.5 NG/ML — SIGNIFICANT CHANGE UP (ref 4.7–20)
GLUCOSE SERPL-MCNC: 103 MG/DL — HIGH (ref 70–99)
HCT VFR BLD CALC: 35.2 % — SIGNIFICANT CHANGE UP (ref 34.5–45)
HGB BLD-MCNC: 11.6 G/DL — SIGNIFICANT CHANGE UP (ref 11.5–15.5)
IMM GRANULOCYTES NFR BLD AUTO: 0.4 % — SIGNIFICANT CHANGE UP (ref 0–1.5)
LYMPHOCYTES # BLD AUTO: 3.38 K/UL — HIGH (ref 1–3.3)
LYMPHOCYTES # BLD AUTO: 34.3 % — SIGNIFICANT CHANGE UP (ref 13–44)
MAGNESIUM SERPL-MCNC: 2.5 MG/DL — SIGNIFICANT CHANGE UP (ref 1.6–2.6)
MCHC RBC-ENTMCNC: 29.6 PG — SIGNIFICANT CHANGE UP (ref 27–34)
MCHC RBC-ENTMCNC: 33 % — SIGNIFICANT CHANGE UP (ref 32–36)
MCV RBC AUTO: 89.8 FL — SIGNIFICANT CHANGE UP (ref 80–100)
MONOCYTES # BLD AUTO: 1.21 K/UL — HIGH (ref 0–0.9)
MONOCYTES NFR BLD AUTO: 12.3 % — SIGNIFICANT CHANGE UP (ref 2–14)
NEUTROPHILS # BLD AUTO: 4.72 K/UL — SIGNIFICANT CHANGE UP (ref 1.8–7.4)
NEUTROPHILS NFR BLD AUTO: 47.8 % — SIGNIFICANT CHANGE UP (ref 43–77)
NRBC # FLD: 0 K/UL — SIGNIFICANT CHANGE UP (ref 0–0)
PHOSPHATE SERPL-MCNC: 3.5 MG/DL — SIGNIFICANT CHANGE UP (ref 2.5–4.5)
PLATELET # BLD AUTO: 295 K/UL — SIGNIFICANT CHANGE UP (ref 150–400)
PMV BLD: 9.1 FL — SIGNIFICANT CHANGE UP (ref 7–13)
POTASSIUM SERPL-MCNC: 4 MMOL/L — SIGNIFICANT CHANGE UP (ref 3.5–5.3)
POTASSIUM SERPL-SCNC: 4 MMOL/L — SIGNIFICANT CHANGE UP (ref 3.5–5.3)
PROT SERPL-MCNC: 6.5 G/DL — SIGNIFICANT CHANGE UP (ref 6–8.3)
RBC # BLD: 3.92 M/UL — SIGNIFICANT CHANGE UP (ref 3.8–5.2)
RBC # FLD: 13.7 % — SIGNIFICANT CHANGE UP (ref 10.3–14.5)
SODIUM SERPL-SCNC: 134 MMOL/L — LOW (ref 135–145)
TSH SERPL-MCNC: 1.08 UIU/ML — SIGNIFICANT CHANGE UP (ref 0.27–4.2)
VIT B12 SERPL-MCNC: 281 PG/ML — SIGNIFICANT CHANGE UP (ref 200–900)
WBC # BLD: 9.86 K/UL — SIGNIFICANT CHANGE UP (ref 3.8–10.5)
WBC # FLD AUTO: 9.86 K/UL — SIGNIFICANT CHANGE UP (ref 3.8–10.5)

## 2020-05-01 PROCEDURE — 99233 SBSQ HOSP IP/OBS HIGH 50: CPT

## 2020-05-01 RX ORDER — CEPHALEXIN 500 MG
500 CAPSULE ORAL
Refills: 0 | Status: DISCONTINUED | OUTPATIENT
Start: 2020-05-01 | End: 2020-05-04

## 2020-05-01 RX ADMIN — Medication 2 MILLIGRAM(S): at 09:31

## 2020-05-01 RX ADMIN — QUETIAPINE FUMARATE 50 MILLIGRAM(S): 200 TABLET, FILM COATED ORAL at 22:24

## 2020-05-01 RX ADMIN — Medication 500 MILLIGRAM(S): at 19:42

## 2020-05-01 RX ADMIN — CEFTRIAXONE 100 MILLIGRAM(S): 500 INJECTION, POWDER, FOR SOLUTION INTRAMUSCULAR; INTRAVENOUS at 04:02

## 2020-05-01 RX ADMIN — ENOXAPARIN SODIUM 40 MILLIGRAM(S): 100 INJECTION SUBCUTANEOUS at 09:30

## 2020-05-01 RX ADMIN — ATORVASTATIN CALCIUM 10 MILLIGRAM(S): 80 TABLET, FILM COATED ORAL at 22:24

## 2020-05-01 NOTE — PROGRESS NOTE BEHAVIORAL HEALTH - NSBHCHARTREVIEWIMAGING_PSY_A_CORE FT
< from: CT Head No Cont (04.29.20 @ 20:45) >    EXAM:  CT BRAIN        PROCEDURE DATE:  Apr 29 2020         INTERPRETATION:  HISTORY: Headache. Concern for acute hemorrhage.    COMPARISON: CT head from 7/1/2016.    TECHNIQUE: Axial noncontrast CT images from the skull base to the vertex were obtained and submitted for interpretation. Coronal and sagittal reformatted images were performed. Bone and soft tissue windows were evaluated.    FINDINGS:    There is no acute intracranial mass-effect, hemorrhage, midline shift, or abnormal extra-axialfluid collection. Gray-white differentiation is maintained.    Mild chronic microvascular ischemic changes are noted.    Ventricles, sulci, and cisterns are normal in size for the patient's age. No hydrocephalus. Basal cisterns are patent.     Paranasal sinuses and mastoid air cells are clear. Calvarium is intact. Hyperostosis frontalis interna.    IMPRESSION:     No acute intracranial bleeding, mass effect, or shift.      < end of copied text >
< from: CT Head No Cont (04.29.20 @ 20:45) >    EXAM:  CT BRAIN        PROCEDURE DATE:  Apr 29 2020         INTERPRETATION:  HISTORY: Headache. Concern for acute hemorrhage.    COMPARISON: CT head from 7/1/2016.    TECHNIQUE: Axial noncontrast CT images from the skull base to the vertex were obtained and submitted for interpretation. Coronal and sagittal reformatted images were performed. Bone and soft tissue windows were evaluated.    FINDINGS:    There is no acute intracranial mass-effect, hemorrhage, midline shift, or abnormal extra-axialfluid collection. Gray-white differentiation is maintained.    Mild chronic microvascular ischemic changes are noted.    Ventricles, sulci, and cisterns are normal in size for the patient's age. No hydrocephalus. Basal cisterns are patent.     Paranasal sinuses and mastoid air cells are clear. Calvarium is intact. Hyperostosis frontalis interna.    IMPRESSION:     No acute intracranial bleeding, mass effect, or shift.      < end of copied text >

## 2020-05-01 NOTE — PROGRESS NOTE BEHAVIORAL HEALTH - NSBHCONSULTMEDAGITATION_PSY_A_CORE FT
Haldol 5mg PO/IM/IV PRN q6h for agitation if qtc <500  Ativan 2mg PO/IM/IV q6h PRN for agitation/anxiety

## 2020-05-01 NOTE — PROGRESS NOTE BEHAVIORAL HEALTH - NSBHCONSULTOBSREASON_PSY_A_CORE FT
Agitation, recent disorganized behaviors, elopement Recent agitation, recent disorganized behaviors, elopement Recent agitation, recent disorganized behaviors, elopement, bit a

## 2020-05-01 NOTE — PROGRESS NOTE BEHAVIORAL HEALTH - NSBHCONSULTFOLLOWDETAILS_PSY_A_CORE FT
Patient will likely need an inpt. admission, pt. cannot leave AMA , will continue to follow closely May need inpt. psych admission of behaviors not steadily improving with UTI treatment, pt. CANNOT leave AMA , will continue to follow closely

## 2020-05-01 NOTE — PROGRESS NOTE BEHAVIORAL HEALTH - NSBHCHARTREVIEWLAB_PSY_A_CORE FT
11.6   9.86  )-----------( 295      ( 01 May 2020 05:45 )             35.2     05-    134<L>  |  99  |  25<H>  ----------------------------<  103<H>  4.0   |  23  |  0.90    Ca    9.3      01 May 2020 05:45  Phos  3.5     05-  Mg     2.5     05-    TPro  6.5  /  Alb  4.1  /  TBili  0.5  /  DBili  x   /  AST  21  /  ALT  31  /  AlkPhos  58  05-    Urinalysis Basic - ( 2020 18:30 )    Color: YELLOW / Appearance: CLEAR / S.028 / pH: 5.5  Gluc: NEGATIVE / Ketone: NEGATIVE  / Bili: NEGATIVE / Urobili: NORMAL   Blood: NEGATIVE / Protein: 50 / Nitrite: NEGATIVE   Leuk Esterase: LARGE / RBC: 0-2 / WBC 25-50   Sq Epi: FEW / Non Sq Epi: x / Bacteria: FEW    Vitamin B12: 281  Folate 13.5    LIVER FUNCTIONS - ( 01 May 2020 05:45 )  Alb: 4.1 g/dL / Pro: 6.5 g/dL / ALK PHOS: 58 u/L / ALT: 31 u/L / AST: 21 u/L / GGT: x           PT/INR - ( 2020 07:55 )   PT: 12.9 SEC;   INR: 1.13          PTT - ( 2020 07:55 )  PTT:32.6 SEC    COVID + (20) 11.6   9.86  )-----------( 295      ( 01 May 2020 05:45 )             35.2     05-    134<L>  |  99  |  25<H>  ----------------------------<  103<H>  4.0   |  23  |  0.90    Ca    9.3      01 May 2020 05:45  Phos  3.5     05-  Mg     2.5     05-    TPro  6.5  /  Alb  4.1  /  TBili  0.5  /  DBili  x   /  AST  21  /  ALT  31  /  AlkPhos  58  05-01    Urinalysis Basic - ( 2020 18:30 )    Color: YELLOW / Appearance: CLEAR / S.028 / pH: 5.5  Gluc: NEGATIVE / Ketone: NEGATIVE  / Bili: NEGATIVE / Urobili: NORMAL   Blood: NEGATIVE / Protein: 50 / Nitrite: NEGATIVE   Leuk Esterase: LARGE / RBC: 0-2 / WBC 25-50   Sq Epi: FEW / Non Sq Epi: x / Bacteria: FEW      LIVER FUNCTIONS - ( 01 May 2020 05:45 )  Alb: 4.1 g/dL / Pro: 6.5 g/dL / ALK PHOS: 58 u/L / ALT: 31 u/L / AST: 21 u/L / GGT: x           PT/INR - ( 2020 07:55 )   PT: 12.9 SEC;   INR: 1.13          PTT - ( 2020 07:55 )  PTT:32.6 SEC

## 2020-05-01 NOTE — PROGRESS NOTE BEHAVIORAL HEALTH - NSBHFUPINTERVALHXFT_PSY_A_CORE
Patient received seroquel 50mg PO qhs last night. Did not require PRNs for agitation. Patient's COVID PCR returned as positive. Patient continues to be on IV ceftriaxone for UTI.    Patient assessed by Dr. Esposito at bedside. Patient assessed by myself via in order to mitigate infection risk due to COVID.   Upon assessment, patient is Patient received seroquel 50mg PO qhs last night. Did not require PRNs for agitation. Patient's COVID PCR returned as positive. Patient continues to be on IV ceftriaxone for UTI.    Patient assessed by Dr. Esposito at bedside. Patient assessed by myself via in order to mitigate infection risk due to COVID.     Upon assessment, patient is calm, sitting in chair. She is cooperative with interview. Patient reports she is feeling "much better," reports having slept well last night. Patient reports no suicidal or homicidal thoughts. Feels safe in the hospital. Patient reports she wants her belongings back. Patient denies AH, VH, no ideas of reference or delusions elicited. Patient received seroquel 50mg PO qhs last night. Did not require PRNs for agitation. Patient's COVID PCR returned as positive. Patient continues to be on IV ceftriaxone for UTI.    Patient assessed by Dr. Esposito (attg) at bedside with telephonic input from Dr. Sin. Patient assessed by myself via in order to mitigate infection risk due to COVID.     Upon assessment, patient is calm, sitting in chair. She is cooperative with interview. Patient reports she is feeling "much better," reports having slept well last night. Patient reports no suicidal or homicidal thoughts. Feels safe in the hospital. Patient reports she wants her belongings back. Patient denies AH, VH, no ideas of reference or delusions elicited. AOx3, recall 1/3, attention poor, unable to do complex tasks due to disorganization.     Per staff patient has been intermittently disorganized, waxing/waning memory issues, keeps forgetting things that happened moments ago, wandering at times.

## 2020-05-01 NOTE — PROGRESS NOTE ADULT - PROBLEM SELECTOR PLAN 2
Pt with UA showing bacteria, and LE. Patient denied symptoms but also with leukocytosis.   - Ceftriaxone for now for a total of 7 days.  - F/u Ucx Pt with UA showing bacteria, and LE. Patient denied symptoms but also with leukocytosis.   - Will transition from CTX to keflex for 7 days total.  - Urine cx growing group B strep.

## 2020-05-01 NOTE — PROGRESS NOTE BEHAVIORAL HEALTH - SUMMARY
55 year old  female living with  and two children, prior psychiatric history of mood disorder, recurrent depression, one manic episode 1.5 years ago, history of UTI related psychosis in 2016, short courses of outpatient treatment, last saw a therapist at [facility] September 2019 while on Lexapro, recent COVID+ a month ago, was brought in by EMS Called by family for erratic behavior in setting of multiple stressors and stopping Lexapro a month ago.    Patient with increasingly erratic behavior at home including taking out $50,000 from bank with plan to run away. Upon initial presentation to ED, showed symptoms consistent with tina, agitation, aggression, impulsivity, insomnia, increase in goal-oriented behavior and is preoccupied with wanting to divorce . Patient was found to have UTI, and was admitted to medicine for treatment of UTI, now on IV antibiotics. Patient received a total of 4mg Ativan and 5mg Haldol in ED, w/ subsequent improvement in disorganization and agitation. Patient has a history of presenting with manic symptoms in the setting of a UTI, which resolved after UTI resolved. This could be similar to prior episode in 2016 of a delirium picture on top of underlying exacerbated psychiatric condition due to the UTI. Patient will likely need psychiatric admission, however will continue to follow patient for now.    DDX; Delirium 2/2 UTI  vs psychosis unspecified vs Bipolar disorder current episode manic    PLAN:  See below  Patient cannot leave AMA. 55 year old  female living with  and two children, prior psychiatric history of mood disorder, recurrent depression, one manic episode 1.5 years ago, history of UTI related psychosis in 2016, short courses of outpatient treatment, last saw a therapist at [facility] September 2019 while on Lexapro, recent COVID+ a month ago, was brought in by EMS Called by family for erratic behavior in setting of multiple stressors and stopping Lexapro a month ago.    Patient with increasingly erratic behavior at home including taking out $50,000 from bank with plan to run away. Upon initial presentation to ED, showed symptoms consistent with tina, agitation, aggression, impulsivity, insomnia, increase in goal-oriented behavior and is preoccupied with wanting to divorce . Patient was found to have UTI, and was admitted to medicine for treatment of UTI, now on IV antibiotics. Patient received a total of 4mg Ativan and 5mg Haldol in ED, w/ subsequent improvement in disorganization and agitation. Patient has a history of presenting with manic symptoms in the setting of a UTI, which resolved after UTI resolved. This could be similar to prior episode in 2016 of a delirium picture on top of underlying exacerbated psychiatric condition due to the UTI. Patient may need psychiatric admission, however will continue to follow patient for now. Continues to deny SI/HI, denies AVH, no delusions or ideas of reference elicited today.    DDX; Delirium 2/2 UTI  vs psychosis unspecified vs Bipolar disorder current episode manic    PLAN:  See below  Patient cannot leave AMA. 55 year old  female living with  and two children, prior psychiatric history of mood disorder, recurrent depression, one manic episode 1.5 years ago, history of UTI related psychosis in 2016, short courses of outpatient treatment, last saw a therapist at [facility] September 2019 while on Lexapro, recent COVID+ a month ago, was brought in by EMS Called by family for erratic behavior in setting of multiple stressors and stopping Lexapro a month ago.    Patient with increasingly erratic behavior at home including taking out $50,000 from bank with plan to run away. Upon initial presentation to ED, showed symptoms consistent with tina, agitation, aggression, impulsivity, insomnia, increase in goal-oriented behavior and is preoccupied with wanting to divorce . Patient was found to have UTI, and was admitted to medicine for treatment of UTI, now on IV antibiotics. Patient received a total of 4mg Ativan and 5mg Haldol in ED, w/ subsequent improvement in disorganization and agitation. Patient has a history of presenting with manic symptoms in the setting of a UTI, which resolved after UTI resolved. This could be similar to prior episode in 2016 of a delirium picture on top of underlying exacerbated psychiatric condition due to the UTI. Patient may need psychiatric admission if her behavioral state does not improve with treatment for UTI, which it has in the past. Will continue to follow patient for now. Continues to deny SI/HI, denies AVH, no delusions or ideas of reference elicited today.    DDX; Delirium 2/2 UTI  vs psychosis unspecified vs Bipolar disorder current episode manic    PLAN:  See below  Patient cannot leave AMA.

## 2020-05-01 NOTE — PROGRESS NOTE ADULT - SUBJECTIVE AND OBJECTIVE BOX
FLAVIA SNELL  55y  Female      Patient is a 55y old  Female who presents with a chief complaint of Psychosis/ Psych Eval (30 Apr 2020 17:24)      INTERVAL HPI/OVERNIGHT EVENTS:          PHYSICAL EXAM:    Consultant(s) Notes Reviewed:  [x ] YES  [ ] NO  Care Discussed with Consultants/Other Providers [ x] YES  [ ] NO      RADIOLOGY & ADDITIONAL TESTS:    Imaging Personally Reviewed:  [x ] YES  [ ] NO SNELL FLAVIA  55y  Female      Patient is a 55y old  Female who presents with a chief complaint of Psychosis/ Psych Eval (30 Apr 2020 17:24)      INTERVAL HPI/OVERNIGHT EVENTS: No events overnight. She received 2 mg IVP ativan prn for anxiety/agitation.    MEDICATIONS  (STANDING):  atorvastatin 10 milliGRAM(s) Oral at bedtime  cefTRIAXone   IVPB 1000 milliGRAM(s) IV Intermittent every 24 hours  dextrose 5%. 1000 milliLiter(s) (50 mL/Hr) IV Continuous <Continuous>  dextrose 50% Injectable 12.5 Gram(s) IV Push once  dextrose 50% Injectable 25 Gram(s) IV Push once  dextrose 50% Injectable 25 Gram(s) IV Push once  enoxaparin Injectable 40 milliGRAM(s) SubCutaneous daily  insulin lispro (HumaLOG) corrective regimen sliding scale   SubCutaneous three times a day before meals  QUEtiapine 50 milliGRAM(s) Oral at bedtime    MEDICATIONS  (PRN):  acetaminophen   Tablet .. 650 milliGRAM(s) Oral every 6 hours PRN Temp greater or equal to 38C (100.4F), Mild Pain (1 - 3), Moderate Pain (4 - 6)  dextrose 40% Gel 15 Gram(s) Oral once PRN Blood Glucose LESS THAN 70 milliGRAM(s)/deciliter  glucagon  Injectable 1 milliGRAM(s) IntraMuscular once PRN Glucose LESS THAN 70 milligrams/deciliter  haloperidol     Tablet 5 milliGRAM(s) Oral every 6 hours PRN anxiety/agitation  haloperidol    Injectable 5 milliGRAM(s) IntraMuscular every 6 hours PRN Agitation  LORazepam     Tablet 2 milliGRAM(s) Oral every 6 hours PRN Anxiety/agitation  LORazepam   Injectable 2 milliGRAM(s) IV Push every 6 hours PRN Anxiety, agitation    Vital Signs Last 24 Hrs  T(C): 36.8 (30 Apr 2020 21:52), Max: 36.8 (30 Apr 2020 17:53)  T(F): 98.3 (30 Apr 2020 21:52), Max: 98.3 (30 Apr 2020 21:52)  HR: 92 (30 Apr 2020 21:52) (92 - 100)  BP: 106/48 (30 Apr 2020 21:52) (106/48 - 121/68)  BP(mean): --  RR: 18 (30 Apr 2020 21:52) (18 - 18)  SpO2: 97% (30 Apr 2020 21:52) (97% - 99%)    PHYSICAL EXAM:  GENERAL: NAD, well-developed, appears sleepy  HEAD:  Atraumatic, Normocephalic  EYES: EOMI, conjunctiva and sclera clear  NECK: Supple, No JVD  CHEST/LUNG: Clear to auscultation bilaterally; No wheeze, ronchi or rales, no increased WOB  HEART: Regular rate and rhythm; No murmurs, rubs, or gallops  ABDOMEN: Soft, Nontender, Nondistended; Bowel sounds present  EXTREMITIES:  2+ Peripheral Pulses, No clubbing, cyanosis, or edema  PSYCH: AAOx2  NEUROLOGY: non-focal  SKIN: No rashes or lesions    LABS:   CBC Full  -  ( 01 May 2020 05:45 )  WBC Count : 9.86 K/uL  RBC Count : 3.92 M/uL  Hemoglobin : 11.6 g/dL  Hematocrit : 35.2 %  Platelet Count - Automated : 295 K/uL  Mean Cell Volume : 89.8 fL  Mean Cell Hemoglobin : 29.6 pg  Mean Cell Hemoglobin Concentration : 33.0 %  Auto Neutrophil # : 4.72 K/uL  Auto Lymphocyte # : 3.38 K/uL  Auto Monocyte # : 1.21 K/uL  Auto Eosinophil # : 0.43 K/uL  Auto Basophil # : 0.08 K/uL  Auto Neutrophil % : 47.8 %  Auto Lymphocyte % : 34.3 %  Auto Monocyte % : 12.3 %  Auto Eosinophil % : 4.4 %  Auto Basophil % : 0.8 %    05-01    134<L>  |  99  |  25<H>  ----------------------------<  103<H>  4.0   |  23  |  0.90    Ca    9.3      01 May 2020 05:45  Phos  3.5     05-01  Mg     2.5     05-01    TPro  6.5  /  Alb  4.1  /  TBili  0.5  /  DBili  x   /  AST  21  /  ALT  31  /  AlkPhos  58  05-01      Consultant(s) Notes Reviewed:  [x ] YES  [ ] NO  Care Discussed with Consultants/Other Providers [ x] YES  [ ] NO      RADIOLOGY & ADDITIONAL TESTS:    Imaging Personally Reviewed:  [x ] YES  [ ] NO SNELL FLAVIA  55y  Female      Patient is a 55y old  Female who presents with a chief complaint of Psychosis/ Psych Eval (30 Apr 2020 17:24)      INTERVAL HPI/OVERNIGHT EVENTS: No events overnight. She received 2 mg IVP ativan prn for anxiety/agitation.  No fever/chills, SOB, CP.    MEDICATIONS  (STANDING):  atorvastatin 10 milliGRAM(s) Oral at bedtime  cefTRIAXone   IVPB 1000 milliGRAM(s) IV Intermittent every 24 hours  dextrose 5%. 1000 milliLiter(s) (50 mL/Hr) IV Continuous <Continuous>  dextrose 50% Injectable 12.5 Gram(s) IV Push once  dextrose 50% Injectable 25 Gram(s) IV Push once  dextrose 50% Injectable 25 Gram(s) IV Push once  enoxaparin Injectable 40 milliGRAM(s) SubCutaneous daily  insulin lispro (HumaLOG) corrective regimen sliding scale   SubCutaneous three times a day before meals  QUEtiapine 50 milliGRAM(s) Oral at bedtime    MEDICATIONS  (PRN):  acetaminophen   Tablet .. 650 milliGRAM(s) Oral every 6 hours PRN Temp greater or equal to 38C (100.4F), Mild Pain (1 - 3), Moderate Pain (4 - 6)  dextrose 40% Gel 15 Gram(s) Oral once PRN Blood Glucose LESS THAN 70 milliGRAM(s)/deciliter  glucagon  Injectable 1 milliGRAM(s) IntraMuscular once PRN Glucose LESS THAN 70 milligrams/deciliter  haloperidol     Tablet 5 milliGRAM(s) Oral every 6 hours PRN anxiety/agitation  haloperidol    Injectable 5 milliGRAM(s) IntraMuscular every 6 hours PRN Agitation  LORazepam     Tablet 2 milliGRAM(s) Oral every 6 hours PRN Anxiety/agitation  LORazepam   Injectable 2 milliGRAM(s) IV Push every 6 hours PRN Anxiety, agitation    Vital Signs Last 24 Hrs  T(C): 36.8 (30 Apr 2020 21:52), Max: 36.8 (30 Apr 2020 17:53)  T(F): 98.3 (30 Apr 2020 21:52), Max: 98.3 (30 Apr 2020 21:52)  HR: 92 (30 Apr 2020 21:52) (92 - 100)  BP: 106/48 (30 Apr 2020 21:52) (106/48 - 121/68)  BP(mean): --  RR: 18 (30 Apr 2020 21:52) (18 - 18)  SpO2: 97% (30 Apr 2020 21:52) (97% - 99%)    PHYSICAL EXAM:  GENERAL: NAD, well-developed, appears sleepy  HEAD:  Atraumatic, Normocephalic  EYES: EOMI, conjunctiva and sclera clear  NECK: Supple, No JVD  CHEST/LUNG: Clear to auscultation bilaterally; No wheeze, ronchi or rales, no increased WOB  HEART: Regular rate and rhythm; No murmurs, rubs, or gallops  ABDOMEN: Soft, Nontender, Nondistended; Bowel sounds present  EXTREMITIES:  2+ Peripheral Pulses, No clubbing, cyanosis, or edema  PSYCH: AAOx2  NEUROLOGY: non-focal  SKIN: No rashes or lesions    LABS:   CBC Full  -  ( 01 May 2020 05:45 )  WBC Count : 9.86 K/uL  RBC Count : 3.92 M/uL  Hemoglobin : 11.6 g/dL  Hematocrit : 35.2 %  Platelet Count - Automated : 295 K/uL  Mean Cell Volume : 89.8 fL  Mean Cell Hemoglobin : 29.6 pg  Mean Cell Hemoglobin Concentration : 33.0 %  Auto Neutrophil # : 4.72 K/uL  Auto Lymphocyte # : 3.38 K/uL  Auto Monocyte # : 1.21 K/uL  Auto Eosinophil # : 0.43 K/uL  Auto Basophil # : 0.08 K/uL  Auto Neutrophil % : 47.8 %  Auto Lymphocyte % : 34.3 %  Auto Monocyte % : 12.3 %  Auto Eosinophil % : 4.4 %  Auto Basophil % : 0.8 %    05-01    134<L>  |  99  |  25<H>  ----------------------------<  103<H>  4.0   |  23  |  0.90    Ca    9.3      01 May 2020 05:45  Phos  3.5     05-01  Mg     2.5     05-01    TPro  6.5  /  Alb  4.1  /  TBili  0.5  /  DBili  x   /  AST  21  /  ALT  31  /  AlkPhos  58  05-01      Consultant(s) Notes Reviewed:  [x ] YES  [ ] NO  Care Discussed with Consultants/Other Providers [ x] YES  [ ] NO      RADIOLOGY & ADDITIONAL TESTS:    Imaging Personally Reviewed:  [x ] YES  [ ] NO

## 2020-05-01 NOTE — PROGRESS NOTE BEHAVIORAL HEALTH - NSBHCHARTREVIEWVS_PSY_A_CORE FT
Vital Signs Last 24 Hrs  T(C): 36.8 (30 Apr 2020 21:52), Max: 36.8 (30 Apr 2020 17:53)  T(F): 98.3 (30 Apr 2020 21:52), Max: 98.3 (30 Apr 2020 21:52)  HR: 92 (30 Apr 2020 21:52) (92 - 100)  BP: 106/48 (30 Apr 2020 21:52) (106/48 - 121/68)  BP(mean): --  RR: 18 (30 Apr 2020 21:52) (17 - 18)  SpO2: 97% (30 Apr 2020 21:52) (97% - 99%)
Vital Signs Last 24 Hrs  T(C): 36.7 (29 Apr 2020 21:30), Max: 37 (29 Apr 2020 18:06)  T(F): 98.1 (29 Apr 2020 21:30), Max: 98.6 (29 Apr 2020 18:06)  HR: 113 (29 Apr 2020 21:30) (113 - 119)  BP: 138/62 (29 Apr 2020 21:30) (138/62 - 147/79)  BP(mean): --  RR: 18 (29 Apr 2020 21:30) (18 - 19)  SpO2: 100% (29 Apr 2020 21:30) (98% - 100%)

## 2020-05-01 NOTE — PROGRESS NOTE BEHAVIORAL HEALTH - NSBHCHARTREVIEWINVESTIGATE_PSY_A_CORE FT
< from: 12 Lead ECG (04.29.20 @ 18:42) >    Ventricular Rate 119 BPM    Atrial Rate 119 BPM    P-R Interval 140 ms    QRS Duration 92 ms    Q-T Interval 330 ms    QTC Calculation(Bezet) 464 ms    P Axis 67 degrees    R Axis -1 degrees    T Axis 77 degrees    Diagnosis Line Sinus tachycardia  Otherwise normal ECG    < end of copied text >
EKG 4/29: QTc: 464ms

## 2020-05-01 NOTE — PROGRESS NOTE BEHAVIORAL HEALTH - OTHER
patient sitting in bed "very good" jose SI and HI, focused on her relationship with he  patient sitting in chair "good" denies SI and HI

## 2020-05-01 NOTE — PROGRESS NOTE ADULT - PROBLEM SELECTOR PLAN 1
Patient with psychosis, multifactorial 2/2 psych med discontinuation, infection (UTI, ?COVID);  - CTH without acute changes   - TSH wnl, Tox Screen Negatve, f/u Vitamin B12 and Folate  - UA concerning for UTI, UCx pending   - Psych following appreciate recs.  - Seroquel 50 mg qhs  - Haldol and Ativan prn per psych Patient with psychosis, multifactorial 2/2 psych med discontinuation, infection (UTI and COVID)  - CTH without acute changes   - TSH wnl, Tox Screen Negative, Vitamin B12 and Folate wnl.  - UA concerning for UTI, UCx growing group B strep  - Psych following appreciate recs. Patient does not have capacity to leave AMA.  - Seroquel 50 mg qhs  - Haldol and Ativan prn per psych

## 2020-05-01 NOTE — PROGRESS NOTE BEHAVIORAL HEALTH - CASE SUMMARY
patient is a 55 year old F, with PPH of unclear mood disorder, PMH of recurrent UTIs and resultant delirium, DMII, HTN psych c/s for agitation, psychosis, bizarre behaviors. Patient less disorganized than before though still has marked cognitive deficits, AOx3 but recall 1/3, attention poor, tangential and perseverative on exam though this appears to be improving. Denies SI/I/P or HI/I/P but as patient is still confused, wandering, and has had recent severe agitation where she bit a , will need to continue to be on 1:1 for safety until patient demonstrates behavioral stability for several days. Would increase seroquel as above and continue tx for UTI. CANNOT leave AMA, does not have capacity to do so has no insight into her behaviors. Will monitor for dispo, home vs. psychiatric admission.

## 2020-05-01 NOTE — PROGRESS NOTE ADULT - PROBLEM SELECTOR PLAN 4
Patient with COVID symptoms 1 month ago, and positive antibody test 1 week ago  - f/u COVID PCR Patient with COVID symptoms 1 month ago, and positive antibody test 1 week ago  - COVID PCR positive. Will place on isolation  - No SOB, on RA

## 2020-05-01 NOTE — PROGRESS NOTE ADULT - ASSESSMENT
55 year old female with major depressive disorder, general anxiety disorder, history of UTI induced psychosis, DM II on metformin, HTN and hyperlipidemia, brought in by EMS for bizarre behavior for the past 3-4 weeks. Admitted for psychosis/ tina in the setting of discontinued psychiatric medication, as well as infection (UTI and ?recent COVID infection). 55 year old female with major depressive disorder, general anxiety disorder, history of UTI induced psychosis, DM II on metformin, HTN and hyperlipidemia, brought in by EMS for bizarre behavior for the past 3-4 weeks. Admitted for psychosis/ tina in the setting of discontinued psychiatric medication, as well as infection (UTI and recent COVID infection).

## 2020-05-02 PROCEDURE — 99231 SBSQ HOSP IP/OBS SF/LOW 25: CPT

## 2020-05-02 PROCEDURE — 99233 SBSQ HOSP IP/OBS HIGH 50: CPT

## 2020-05-02 RX ORDER — QUETIAPINE FUMARATE 200 MG/1
100 TABLET, FILM COATED ORAL AT BEDTIME
Refills: 0 | Status: DISCONTINUED | OUTPATIENT
Start: 2020-05-02 | End: 2020-05-04

## 2020-05-02 RX ADMIN — Medication 500 MILLIGRAM(S): at 11:59

## 2020-05-02 RX ADMIN — ATORVASTATIN CALCIUM 10 MILLIGRAM(S): 80 TABLET, FILM COATED ORAL at 23:05

## 2020-05-02 RX ADMIN — ENOXAPARIN SODIUM 40 MILLIGRAM(S): 100 INJECTION SUBCUTANEOUS at 11:59

## 2020-05-02 RX ADMIN — QUETIAPINE FUMARATE 100 MILLIGRAM(S): 200 TABLET, FILM COATED ORAL at 23:06

## 2020-05-02 RX ADMIN — Medication 650 MILLIGRAM(S): at 11:59

## 2020-05-02 RX ADMIN — Medication 500 MILLIGRAM(S): at 17:47

## 2020-05-02 RX ADMIN — Medication 500 MILLIGRAM(S): at 23:05

## 2020-05-02 RX ADMIN — Medication 500 MILLIGRAM(S): at 05:47

## 2020-05-02 RX ADMIN — Medication 500 MILLIGRAM(S): at 00:57

## 2020-05-02 NOTE — PROGRESS NOTE ADULT - ATTENDING COMMENTS
UTI on PO keflex based on urine cx  F/u Psych recs  Dispo pending psych In accordance with current standard limiting pt contact, pt was not seen in person due to infection mitigation and preservation of PPE for the novel COVID-19 pandemic.  UTI on PO keflex based on urine cx  F/u Psych recs  Dispo pending psych In accordance with current standard limiting pt contact, pt was not personally examined due to infection mitigation and preservation of PPE for the novel COVID-19 pandemic.  UTI on PO keflex based on urine cx  F/u Psych recs  Dispo pending psych

## 2020-05-02 NOTE — CHART NOTE - NSCHARTNOTEFT_GEN_A_CORE
Patient was not seen in person due to infection risk mitigation efforts in light of novel coronavirus pandemic. Primary team in agreement with verbal recommendations. Spoke with resident, Dr. Root who was in agreement.    Patient required no PRNs overnight. Took standing Seroquel 50 mg qhs. Per staff patient has been intermittently disorganized, waxing/waning memory issues, forgetting why she is in the hospital.     Per medical team, will now transition to PO antibiotics for UTI. Not medically cleared at this time, but likely to be cleared soon.     Vital Signs Last 24 Hrs  T(C): 37 (02 May 2020 04:10), Max: 37 (02 May 2020 04:10)  T(F): 98.6 (02 May 2020 04:10), Max: 98.6 (02 May 2020 04:10)  HR: 69 (02 May 2020 04:10) (69 - 88)  BP: 113/67 (02 May 2020 04:10) (110/72 - 135/74)  BP(mean): 76 (02 May 2020 04:10) (76 - 76)  RR: 18 (02 May 2020 04:10) (18 - 18)  SpO2: 98% (02 May 2020 04:10) (98% - 99%)    Labs reviewed personally [X]                        11.6   9.86  )-----------( 295      ( 01 May 2020 05:45 )             35.2     Hgb Trend: 11.6<--, 12.4<--, 13.5<--    05-01    134<L>  |  99  |  25<H>  ----------------------------<  103<H>  4.0   |  23  |  0.90    Ca    9.3      01 May 2020 05:45  Phos  3.5     05-01  Mg     2.5     05-01    TPro  6.5  /  Alb  4.1  /  TBili  0.5  /  DBili  x   /  AST  21  /  ALT  31  /  AlkPhos  58  05-01    Creatinine Trend: 0.90<--, 0.88<--, 1.13<--      < from: 12 Lead ECG (04.29.20 @ 18:42) >    QTC Calculation(Bezet) 464 ms    Diagnosis Line Sinus tachycardia  Otherwise normal ECG    < end of copied text >      Assessment:  55 year old  female living with  and two children, prior psychiatric history of mood disorder, recurrent depression, one manic episode 1.5 years ago, history of UTI related psychosis in 2016, short courses of outpatient treatment, last saw a therapist at [facility] September 2019 while on Lexapro, recent COVID+ a month ago, was brought in by EMS Called by family for erratic behavior in setting of multiple stressors and stopping Lexapro a month ago.    Patient with increasingly erratic behavior at home including taking out $50,000 from bank with plan to run away. Upon initial presentation to ED, showed symptoms consistent with tina, agitation, aggression, impulsivity, insomnia, increase in goal-oriented behavior and is preoccupied with wanting to divorce . Patient was found to have UTI, and was admitted to medicine for treatment of UTI, now on IV antibiotics. Patient received a total of 4mg Ativan and 5mg Haldol in ED, w/ subsequent improvement in disorganization and agitation. Patient has a history of presenting with manic symptoms in the setting of a UTI, which resolved after UTI resolved. This could be similar to prior episode in 2016 of a delirium picture on top of underlying exacerbated psychiatric condition due to the UTI. Patient may need psychiatric admission if her behavioral state does not improve with treatment for UTI, which it has in the past. Will continue to follow patient for now.     Per primary team, patient continues to be confused, waxing/waning, forgetting why she is in the hospital. Did not require PRNs overnight, and took standing Seroquel 50 mg.     DDX; Delirium 2/2 UTI  vs psychosis unspecified vs Bipolar disorder current episode manic      Plan:  1. c/w constant observation due to confusion and erratic behavior  2. Psych: can increase seroquel to 100 mg qhs  3. PRNs: Haldol 5mg PO/IM/IV PRN q6h for agitation if qtc <500  4. Psych to continue to follow, if medically cleared, will likely require transfer to med/psych inpatient hospital for further treatment as she continues to be confused. Will see daily to assess need for psychiatric admission and constant observation.   5. Case discussed with primary team. Patient was not seen in person due to infection risk mitigation efforts in light of novel coronavirus pandemic. Primary team in agreement with verbal recommendations. Spoke with resident, Dr. Root who was in agreement.    Patient required no PRNs overnight. Took standing Seroquel 50 mg qhs. Per staff patient has been intermittently disorganized, waxing/waning memory issues, forgetting why she is in the hospital.     Per medical team, will now transition to PO antibiotics for UTI. Not medically cleared at this time, but likely to be cleared soon.     Vital Signs Last 24 Hrs  T(C): 37 (02 May 2020 04:10), Max: 37 (02 May 2020 04:10)  T(F): 98.6 (02 May 2020 04:10), Max: 98.6 (02 May 2020 04:10)  HR: 69 (02 May 2020 04:10) (69 - 88)  BP: 113/67 (02 May 2020 04:10) (110/72 - 135/74)  BP(mean): 76 (02 May 2020 04:10) (76 - 76)  RR: 18 (02 May 2020 04:10) (18 - 18)  SpO2: 98% (02 May 2020 04:10) (98% - 99%)    Labs reviewed personally [X]                        11.6   9.86  )-----------( 295      ( 01 May 2020 05:45 )             35.2     Hgb Trend: 11.6<--, 12.4<--, 13.5<--    05-01    134<L>  |  99  |  25<H>  ----------------------------<  103<H>  4.0   |  23  |  0.90    Ca    9.3      01 May 2020 05:45  Phos  3.5     05-01  Mg     2.5     05-01    TPro  6.5  /  Alb  4.1  /  TBili  0.5  /  DBili  x   /  AST  21  /  ALT  31  /  AlkPhos  58  05-01    Creatinine Trend: 0.90<--, 0.88<--, 1.13<--      < from: 12 Lead ECG (04.29.20 @ 18:42) >    QTC Calculation(Bezet) 464 ms    Diagnosis Line Sinus tachycardia  Otherwise normal ECG    < end of copied text >      Assessment:  55 year old  female living with  and two children, prior psychiatric history of mood disorder, recurrent depression, one manic episode 1.5 years ago, history of UTI related psychosis in 2016, short courses of outpatient treatment, last saw a therapist at [facility] September 2019 while on Lexapro, recent COVID+ a month ago, was brought in by EMS Called by family for erratic behavior in setting of multiple stressors and stopping Lexapro a month ago.    Patient with increasingly erratic behavior at home including taking out $50,000 from bank with plan to run away. Upon initial presentation to ED, showed symptoms consistent with tina, agitation, aggression, impulsivity, insomnia, increase in goal-oriented behavior and is preoccupied with wanting to divorce . Patient was found to have UTI, and was admitted to medicine for treatment of UTI, now on IV antibiotics. Patient received a total of 4mg Ativan and 5mg Haldol in ED, w/ subsequent improvement in disorganization and agitation. Patient has a history of presenting with manic symptoms in the setting of a UTI, which resolved after UTI resolved. This could be similar to prior episode in 2016 of a delirium picture on top of underlying exacerbated psychiatric condition due to the UTI. Patient may need psychiatric admission if her behavioral state does not improve with treatment for UTI, which it has in the past. Will continue to follow patient for now.     Per primary team, patient continues to be confused, waxing/waning, forgetting why she is in the hospital. Did not require PRNs overnight, and took standing Seroquel 50 mg.     DDX; Delirium 2/2 UTI  vs psychosis unspecified vs Bipolar disorder current episode manic      Plan:  1. c/w constant observation due to confusion and erratic behavior  2. Psych: can increase seroquel to 100 mg qhs  3. PRNs: Haldol 5mg PO/IM/IV PRN q6h for agitation if qtc <500  4. Psych to continue to follow, if medically cleared, will likely require transfer to med/psych inpatient hospital for further treatment as she continues to be confused. Will see daily to assess need for psychiatric admission and constant observation.   5. Case discussed with primary team.    Addendum: Dr. Jimmy VENEGAS attending: Pt was not seen in person due to infection mitigation for the novel COVID-19 pandemic. I agree with the above assessment and plan.

## 2020-05-02 NOTE — PROGRESS NOTE ADULT - PROBLEM SELECTOR PLAN 4
Patient with COVID symptoms 1 month ago, and positive antibody test 1 week ago  - COVID PCR positive. Will place on isolation  - No SOB, on RA Patient with COVID symptoms 1 month ago, and positive antibody test 1 week ago  - COVID PCR positive. Continue isolation  - No SOB, on RA

## 2020-05-02 NOTE — PROGRESS NOTE ADULT - SUBJECTIVE AND OBJECTIVE BOX
FLAVIA SNELL  55y  Female      Patient is a 55y old  Female who presents with a chief complaint of Psychosis/ Psych Eval (01 May 2020 09:15)      INTERVAL HPI/OVERNIGHT EVENTS:    MEDICATIONS  (STANDING):  atorvastatin 10 milliGRAM(s) Oral at bedtime  cephalexin 500 milliGRAM(s) Oral four times a day  dextrose 5%. 1000 milliLiter(s) (50 mL/Hr) IV Continuous <Continuous>  dextrose 50% Injectable 12.5 Gram(s) IV Push once  dextrose 50% Injectable 25 Gram(s) IV Push once  dextrose 50% Injectable 25 Gram(s) IV Push once  enoxaparin Injectable 40 milliGRAM(s) SubCutaneous daily  insulin lispro (HumaLOG) corrective regimen sliding scale   SubCutaneous three times a day before meals  QUEtiapine 50 milliGRAM(s) Oral at bedtime    MEDICATIONS  (PRN):  acetaminophen   Tablet .. 650 milliGRAM(s) Oral every 6 hours PRN Temp greater or equal to 38C (100.4F), Mild Pain (1 - 3), Moderate Pain (4 - 6)  dextrose 40% Gel 15 Gram(s) Oral once PRN Blood Glucose LESS THAN 70 milliGRAM(s)/deciliter  glucagon  Injectable 1 milliGRAM(s) IntraMuscular once PRN Glucose LESS THAN 70 milligrams/deciliter  haloperidol     Tablet 5 milliGRAM(s) Oral every 6 hours PRN anxiety/agitation  haloperidol    Injectable 5 milliGRAM(s) IntraMuscular every 6 hours PRN Agitation  LORazepam     Tablet 2 milliGRAM(s) Oral every 6 hours PRN Anxiety/agitation  LORazepam   Injectable 2 milliGRAM(s) IV Push every 6 hours PRN Anxiety, agitation    Vital Signs Last 24 Hrs  T(C): 37 (02 May 2020 04:10), Max: 37 (02 May 2020 04:10)  T(F): 98.6 (02 May 2020 04:10), Max: 98.6 (02 May 2020 04:10)  HR: 69 (02 May 2020 04:10) (69 - 88)  BP: 113/67 (02 May 2020 04:10) (110/72 - 135/74)  BP(mean): 76 (02 May 2020 04:10) (76 - 76)  RR: 18 (02 May 2020 04:10) (18 - 18)  SpO2: 98% (02 May 2020 04:10) (98% - 99%)    PHYSICAL EXAM:    LABS:    Consultant(s) Notes Reviewed:  [x ] YES  [ ] NO  Care Discussed with Consultants/Other Providers [ x] YES  [ ] NO      RADIOLOGY & ADDITIONAL TESTS:    Imaging Personally Reviewed:  [x ] YES  [ ] NO FLAVIA SNELL  55y  Female      Patient is a 55y old  Female who presents with a chief complaint of Psychosis/ Psych Eval (01 May 2020 09:15)      INTERVAL HPI/OVERNIGHT EVENTS: No acute events overnight. Patient states she wants to go home and not to inpatient psych. Appears anxious.    MEDICATIONS  (STANDING):  atorvastatin 10 milliGRAM(s) Oral at bedtime  cephalexin 500 milliGRAM(s) Oral four times a day  dextrose 5%. 1000 milliLiter(s) (50 mL/Hr) IV Continuous <Continuous>  dextrose 50% Injectable 12.5 Gram(s) IV Push once  dextrose 50% Injectable 25 Gram(s) IV Push once  dextrose 50% Injectable 25 Gram(s) IV Push once  enoxaparin Injectable 40 milliGRAM(s) SubCutaneous daily  insulin lispro (HumaLOG) corrective regimen sliding scale   SubCutaneous three times a day before meals  QUEtiapine 50 milliGRAM(s) Oral at bedtime    MEDICATIONS  (PRN):  acetaminophen   Tablet .. 650 milliGRAM(s) Oral every 6 hours PRN Temp greater or equal to 38C (100.4F), Mild Pain (1 - 3), Moderate Pain (4 - 6)  dextrose 40% Gel 15 Gram(s) Oral once PRN Blood Glucose LESS THAN 70 milliGRAM(s)/deciliter  glucagon  Injectable 1 milliGRAM(s) IntraMuscular once PRN Glucose LESS THAN 70 milligrams/deciliter  haloperidol     Tablet 5 milliGRAM(s) Oral every 6 hours PRN anxiety/agitation  haloperidol    Injectable 5 milliGRAM(s) IntraMuscular every 6 hours PRN Agitation  LORazepam     Tablet 2 milliGRAM(s) Oral every 6 hours PRN Anxiety/agitation  LORazepam   Injectable 2 milliGRAM(s) IV Push every 6 hours PRN Anxiety, agitation    Vital Signs Last 24 Hrs  T(C): 37 (02 May 2020 04:10), Max: 37 (02 May 2020 04:10)  T(F): 98.6 (02 May 2020 04:10), Max: 98.6 (02 May 2020 04:10)  HR: 69 (02 May 2020 04:10) (69 - 88)  BP: 113/67 (02 May 2020 04:10) (110/72 - 135/74)  BP(mean): 76 (02 May 2020 04:10) (76 - 76)  RR: 18 (02 May 2020 04:10) (18 - 18)  SpO2: 98% (02 May 2020 04:10) (98% - 99%)    PHYSICAL EXAM:  GENERAL: Anxious, well-developed  HEAD:  Atraumatic, Normocephalic  EYES: EOMI, conjunctiva and sclera clear  NECK: Supple, No JVD  CHEST/LUNG: Clear to auscultation bilaterally; No wheeze, ronchi or rales, no increased WOB  HEART: Regular rate and rhythm; No murmurs, rubs, or gallops  ABDOMEN: Soft, Nontender, Nondistended; Bowel sounds present  EXTREMITIES:  2+ Peripheral Pulses, No clubbing, cyanosis, or edema  PSYCH: AAOx3, anxious  NEUROLOGY: non-focal  SKIN: No rashes or lesions    LABS:  CBC Full  -  ( 01 May 2020 05:45 )  WBC Count : 9.86 K/uL  RBC Count : 3.92 M/uL  Hemoglobin : 11.6 g/dL  Hematocrit : 35.2 %  Platelet Count - Automated : 295 K/uL  Mean Cell Volume : 89.8 fL  Mean Cell Hemoglobin : 29.6 pg  Mean Cell Hemoglobin Concentration : 33.0 %  Auto Neutrophil # : 4.72 K/uL  Auto Lymphocyte # : 3.38 K/uL  Auto Monocyte # : 1.21 K/uL  Auto Eosinophil # : 0.43 K/uL  Auto Basophil # : 0.08 K/uL  Auto Neutrophil % : 47.8 %  Auto Lymphocyte % : 34.3 %  Auto Monocyte % : 12.3 %  Auto Eosinophil % : 4.4 %  Auto Basophil % : 0.8 %    05-01    134<L>  |  99  |  25<H>  ----------------------------<  103<H>  4.0   |  23  |  0.90    Ca    9.3      01 May 2020 05:45  Phos  3.5     05-01  Mg     2.5     05-01    TPro  6.5  /  Alb  4.1  /  TBili  0.5  /  DBili  x   /  AST  21  /  ALT  31  /  AlkPhos  58  05-01    Consultant(s) Notes Reviewed:  [x ] YES  [ ] NO  Care Discussed with Consultants/Other Providers [ x] YES  [ ] NO      RADIOLOGY & ADDITIONAL TESTS:    Imaging Personally Reviewed:  [x ] YES  [ ] NO FLAVIA SNELL  55y  Female      Patient is a 55y old  Female who presents with a chief complaint of Psychosis/ Psych Eval (01 May 2020 09:15)      INTERVAL HPI/OVERNIGHT EVENTS: No acute events overnight. Patient states she wants to go home and not to inpatient psych. Appears anxious.  No CP, SOB, f/c.	    MEDICATIONS  (STANDING):  atorvastatin 10 milliGRAM(s) Oral at bedtime  cephalexin 500 milliGRAM(s) Oral four times a day  dextrose 5%. 1000 milliLiter(s) (50 mL/Hr) IV Continuous <Continuous>  dextrose 50% Injectable 12.5 Gram(s) IV Push once  dextrose 50% Injectable 25 Gram(s) IV Push once  dextrose 50% Injectable 25 Gram(s) IV Push once  enoxaparin Injectable 40 milliGRAM(s) SubCutaneous daily  insulin lispro (HumaLOG) corrective regimen sliding scale   SubCutaneous three times a day before meals  QUEtiapine 50 milliGRAM(s) Oral at bedtime    MEDICATIONS  (PRN):  acetaminophen   Tablet .. 650 milliGRAM(s) Oral every 6 hours PRN Temp greater or equal to 38C (100.4F), Mild Pain (1 - 3), Moderate Pain (4 - 6)  dextrose 40% Gel 15 Gram(s) Oral once PRN Blood Glucose LESS THAN 70 milliGRAM(s)/deciliter  glucagon  Injectable 1 milliGRAM(s) IntraMuscular once PRN Glucose LESS THAN 70 milligrams/deciliter  haloperidol     Tablet 5 milliGRAM(s) Oral every 6 hours PRN anxiety/agitation  haloperidol    Injectable 5 milliGRAM(s) IntraMuscular every 6 hours PRN Agitation  LORazepam     Tablet 2 milliGRAM(s) Oral every 6 hours PRN Anxiety/agitation  LORazepam   Injectable 2 milliGRAM(s) IV Push every 6 hours PRN Anxiety, agitation    Vital Signs Last 24 Hrs  T(C): 37 (02 May 2020 04:10), Max: 37 (02 May 2020 04:10)  T(F): 98.6 (02 May 2020 04:10), Max: 98.6 (02 May 2020 04:10)  HR: 69 (02 May 2020 04:10) (69 - 88)  BP: 113/67 (02 May 2020 04:10) (110/72 - 135/74)  BP(mean): 76 (02 May 2020 04:10) (76 - 76)  RR: 18 (02 May 2020 04:10) (18 - 18)  SpO2: 98% (02 May 2020 04:10) (98% - 99%)    PHYSICAL EXAM:  GENERAL: Anxious, well-developed  HEAD:  Atraumatic, Normocephalic  EYES: EOMI, conjunctiva and sclera clear  NECK: Supple, No JVD  CHEST/LUNG: Clear to auscultation bilaterally; No wheeze, ronchi or rales, no increased WOB  HEART: Regular rate and rhythm; No murmurs, rubs, or gallops  ABDOMEN: Soft, Nontender, Nondistended; Bowel sounds present  EXTREMITIES:  2+ Peripheral Pulses, No clubbing, cyanosis, or edema  PSYCH: AAOx3, anxious  NEUROLOGY: non-focal  SKIN: No rashes or lesions    LABS:  CBC Full  -  ( 01 May 2020 05:45 )  WBC Count : 9.86 K/uL  RBC Count : 3.92 M/uL  Hemoglobin : 11.6 g/dL  Hematocrit : 35.2 %  Platelet Count - Automated : 295 K/uL  Mean Cell Volume : 89.8 fL  Mean Cell Hemoglobin : 29.6 pg  Mean Cell Hemoglobin Concentration : 33.0 %  Auto Neutrophil # : 4.72 K/uL  Auto Lymphocyte # : 3.38 K/uL  Auto Monocyte # : 1.21 K/uL  Auto Eosinophil # : 0.43 K/uL  Auto Basophil # : 0.08 K/uL  Auto Neutrophil % : 47.8 %  Auto Lymphocyte % : 34.3 %  Auto Monocyte % : 12.3 %  Auto Eosinophil % : 4.4 %  Auto Basophil % : 0.8 %    05-01    134<L>  |  99  |  25<H>  ----------------------------<  103<H>  4.0   |  23  |  0.90    Ca    9.3      01 May 2020 05:45  Phos  3.5     05-01  Mg     2.5     05-01    TPro  6.5  /  Alb  4.1  /  TBili  0.5  /  DBili  x   /  AST  21  /  ALT  31  /  AlkPhos  58  05-01    Consultant(s) Notes Reviewed:  [x ] YES  [ ] NO  Care Discussed with Consultants/Other Providers [ x] YES  [ ] NO      RADIOLOGY & ADDITIONAL TESTS:    Imaging Personally Reviewed:  [x ] YES  [ ] NO

## 2020-05-02 NOTE — PROGRESS NOTE ADULT - PROBLEM SELECTOR PLAN 2
Pt with UA showing bacteria, and LE. Patient denied symptoms but also with leukocytosis.   - Will transition from CTX to keflex for 7 days total.  - Urine cx growing group B strep.

## 2020-05-02 NOTE — PROGRESS NOTE ADULT - ASSESSMENT
55 year old female with major depressive disorder, general anxiety disorder, history of UTI induced psychosis, DM II on metformin, HTN and hyperlipidemia, brought in by EMS for bizarre behavior for the past 3-4 weeks. Admitted for psychosis/ tina in the setting of discontinued psychiatric medication, as well as infection (UTI and recent COVID infection).

## 2020-05-02 NOTE — PROGRESS NOTE ADULT - PROBLEM SELECTOR PLAN 1
Patient with psychosis, multifactorial 2/2 psych med discontinuation, infection (UTI and COVID)  - CTH without acute changes   - TSH wnl, Tox Screen Negative, Vitamin B12 and Folate wnl.  - UA concerning for UTI, UCx growing group B strep  - Psych following appreciate recs. Patient does not have capacity to leave AMA.  - Seroquel 50 mg qhs  - Haldol and Ativan prn per psych Patient with psychosis, multifactorial 2/2 psych med discontinuation, infection (UTI and COVID)  - CTH without acute changes   - TSH wnl, Tox Screen Negative, Vitamin B12 and Folate wnl.  - UA concerning for UTI, UCx growing group B strep  - Psych following appreciate recs. Patient does not have capacity to leave AMA.  - Seroquel 100 mg qhs  - Haldol and Ativan prn per psych  - will eventually go to [place] inpatient psych d/t COVID diagnosis

## 2020-05-03 ENCOUNTER — TRANSCRIPTION ENCOUNTER (OUTPATIENT)
Age: 55
End: 2020-05-03

## 2020-05-03 PROCEDURE — 99231 SBSQ HOSP IP/OBS SF/LOW 25: CPT

## 2020-05-03 PROCEDURE — 99233 SBSQ HOSP IP/OBS HIGH 50: CPT

## 2020-05-03 RX ADMIN — ATORVASTATIN CALCIUM 10 MILLIGRAM(S): 80 TABLET, FILM COATED ORAL at 22:58

## 2020-05-03 RX ADMIN — Medication 500 MILLIGRAM(S): at 22:58

## 2020-05-03 RX ADMIN — ENOXAPARIN SODIUM 40 MILLIGRAM(S): 100 INJECTION SUBCUTANEOUS at 12:33

## 2020-05-03 RX ADMIN — Medication 500 MILLIGRAM(S): at 17:39

## 2020-05-03 RX ADMIN — QUETIAPINE FUMARATE 100 MILLIGRAM(S): 200 TABLET, FILM COATED ORAL at 23:49

## 2020-05-03 RX ADMIN — Medication 500 MILLIGRAM(S): at 05:23

## 2020-05-03 RX ADMIN — Medication 500 MILLIGRAM(S): at 12:33

## 2020-05-03 NOTE — PROGRESS NOTE ADULT - ATTENDING COMMENTS
In accordance with current standard limiting pt contact, pt was not personally examined due to infection mitigation and preservation of PPE for the novel COVID-19 pandemic.   Medically improved - awaiting Psych dispo

## 2020-05-03 NOTE — DISCHARGE NOTE PROVIDER - CARE PROVIDER_API CALL
Chapo Mcmullen)  [facility] [address], [address]  [place] [address]  Phone: [phone]  Fax: [phone]  Established Patient  Follow Up Time:

## 2020-05-03 NOTE — DISCHARGE NOTE PROVIDER - NSDCCPCAREPLAN_GEN_ALL_CORE_FT
PRINCIPAL DISCHARGE DIAGNOSIS  Diagnosis: UTI (urinary tract infection)  Assessment and Plan of Treatment: You have been seen and evaluated for UTI for which has been treated with antibiotics. However, you are being discharged with an oral antibiotic which must be take for the next two days. Please make sure to follow up with you PCP regarding this visit.      SECONDARY DISCHARGE DIAGNOSES  Diagnosis: Psychosis  Assessment and Plan of Treatment: You have been seen and evaluated for a change in you behavior which has now since resolved. You have been evaluated by the inpatient psychiatry team who have cleared you for discharge with appropriate follow up. Please make sure to follow up with your PCP and psychiatrist regarding this issue.

## 2020-05-03 NOTE — DISCHARGE NOTE PROVIDER - NSDCMRMEDTOKEN_GEN_ALL_CORE_FT
amLODIPine 2.5 mg oral tablet: 1 tab(s) orally once a day  Lipitor: 10 milligram(s) orally once a day (at bedtime)  losartan 25 mg oral tablet: 1 tab(s) orally once a day  metFORMIN 500 mg oral tablet: 1 tab(s) orally once a day amLODIPine 2.5 mg oral tablet: 1 tab(s) orally once a day  cephalexin 500 mg oral capsule: 1 cap(s) orally 4 times a day  Lipitor: 10 milligram(s) orally once a day (at bedtime)  losartan 25 mg oral tablet: 1 tab(s) orally once a day  metFORMIN 500 mg oral tablet: 1 tab(s) orally once a day  QUEtiapine 100 mg oral tablet: 1 tab(s) orally once a day (at bedtime)

## 2020-05-03 NOTE — PROGRESS NOTE ADULT - PROBLEM SELECTOR PLAN 4
Patient with COVID symptoms 1 month ago, and positive antibody test 1 week ago  - COVID PCR positive. Continue isolation  - No SOB, on RA

## 2020-05-03 NOTE — CHART NOTE - NSCHARTNOTEFT_GEN_A_CORE
Patient was not seen in person due to infection risk mitigation efforts in light of novel coronavirus pandemic. Primary team in agreement with verbal recommendations. Spoke with Dr. Sutton.    Patient required no PRNs overnight. Took standing Seroquel 100 mg qhs. Per nursing staff patient has not been confused/wandering or agitated. Per Dr. Sutton, pt has been calm and linear, however jumps from topic to topic.    Spoke with pt, who is A&Ox4, states that she was initially admitted for UTI and "not acting like myself" but feels she is returning to her baseline, and states her family agrees as well. She was circumstantial at times but answered all questions appropriately. Denied depression, SI/HI, AH, delusions, IOR. States she is sleeping well. 2/3 words short term recall after 5 mins.       Vital Signs Last 24 Hrs  T(C): 36.4 (02 May 2020 22:18), Max: 36.4 (02 May 2020 22:18)  T(F): 97.6 (02 May 2020 22:18), Max: 97.6 (02 May 2020 22:18)  HR: 71 (02 May 2020 22:18) (71 - 71)  BP: 127/76 (02 May 2020 22:18) (127/76 - 127/76)  BP(mean): --  RR: 18 (02 May 2020 22:18) (18 - 18)  SpO2: 98% (02 May 2020 22:18) (98% - 98%)    No recent labs.                  < from: 12 Lead ECG (04.29.20 @ 18:42) >    QTC Calculation(Bezet) 464 ms    Diagnosis Line Sinus tachycardia  Otherwise normal ECG    < end of copied text >      Assessment:  55 year old  female living with  and two children, prior psychiatric history of mood disorder, recurrent depression, one manic episode 1.5 years ago, history of UTI related psychosis in 2016, short courses of outpatient treatment, last saw a therapist at [facility] September 2019 while on Lexapro, recent COVID+ a month ago, was brought in by EMS Called by family for erratic behavior in setting of multiple stressors and stopping Lexapro a month ago.    Patient with increasingly erratic behavior at home including taking out $50,000 from bank with plan to run away. Upon initial presentation to ED, showed symptoms consistent with tina, agitation, aggression, impulsivity, insomnia, increase in goal-oriented behavior and is preoccupied with wanting to divorce . Patient was found to have UTI, and was admitted to medicine for treatment of UTI, now on IV antibiotics. Patient received a total of 4mg Ativan and 5mg Haldol in ED, w/ subsequent improvement in disorganization and agitation. Patient has a history of presenting with manic symptoms in the setting of a UTI, which resolved after UTI resolved. This could be similar to prior episode in 2016 of a delirium picture on top of underlying exacerbated psychiatric condition due to the UTI. Patient may need psychiatric admission if her behavioral state does not improve with treatment for UTI, which it has in the past. Will continue to follow patient for now.     Per primary team, pt is medically cleared today, somewhat less confused, however still changes topics quickly. Pt is circumstantial and talkative on interview but not pressured, no overt manic or psychotic sx, no overt disorganization. A&Ox4. No PRNs, no recent agitation. Adherent with Seroquel.    DDX; Delirium 2/2 UTI  vs psychosis unspecified vs Bipolar disorder current episode manic      Plan:  1. c/w constant observation due to recent confusion and erratic behavior/agitation prior to admission  2. Psych: can c/w seroquel 100 mg qhs  3. PRNs: Haldol 5mg PO/IM/IV PRN q6h for agitation if qtc <500  4. Psych to continue to follow. will evaluate tomorrow for need for inpt psych vs dispo home as pt's mental status appears to be improving.  5. Case discussed with primary team.    Addendum: Dr. Marquez CL attending: Pt was not seen in person due to infection mitigation for the novel COVID-19 pandemic. I agree with the above assessment and plan. Patient was not seen in person due to infection risk mitigation efforts in light of novel coronavirus pandemic. Primary team in agreement with verbal recommendations. Spoke with Dr. Sutton.    Patient required no PRNs overnight. Took standing Seroquel 100 mg qhs. Per nursing staff patient has not been confused/wandering or agitated. Per Dr. Sutton, pt has been calm and linear, however jumps from topic to topic.    Spoke with pt, who is A&Ox4, states that she was initially admitted for UTI and "not acting like myself" but feels she is returning to her baseline, and states her family agrees as well. She was circumstantial at times but answered all questions appropriately. Denied depression, SI/HI, AH, delusions, IOR. States she is sleeping well. 2/3 words short term recall after 5 mins.       Vital Signs Last 24 Hrs  T(C): 36.4 (02 May 2020 22:18), Max: 36.4 (02 May 2020 22:18)  T(F): 97.6 (02 May 2020 22:18), Max: 97.6 (02 May 2020 22:18)  HR: 71 (02 May 2020 22:18) (71 - 71)  BP: 127/76 (02 May 2020 22:18) (127/76 - 127/76)  BP(mean): --  RR: 18 (02 May 2020 22:18) (18 - 18)  SpO2: 98% (02 May 2020 22:18) (98% - 98%)    No recent labs.                  < from: 12 Lead ECG (04.29.20 @ 18:42) >    QTC Calculation(Bezet) 464 ms    Diagnosis Line Sinus tachycardia  Otherwise normal ECG      Assessment:  55 year old  female living with  and two children, prior psychiatric history of mood disorder, recurrent depression, one manic episode 1.5 years ago, history of UTI related psychosis in 2016, short courses of outpatient treatment, last saw a therapist at [facility] September 2019 while on Lexapro, recent COVID+ a month ago, was brought in by EMS Called by family for erratic behavior in setting of multiple stressors and stopping Lexapro a month ago.    Patient with increasingly erratic behavior at home including taking out $50,000 from bank with plan to run away. Upon initial presentation to ED, showed symptoms consistent with tina, agitation, aggression, impulsivity, insomnia, increase in goal-oriented behavior and is preoccupied with wanting to divorce . Patient was found to have UTI, and was admitted to medicine for treatment of UTI, now on IV antibiotics. Patient received a total of 4mg Ativan and 5mg Haldol in ED, w/ subsequent improvement in disorganization and agitation. Patient has a history of presenting with manic symptoms in the setting of a UTI, which resolved after UTI resolved. This could be similar to prior episode in 2016 of a delirium picture on top of underlying exacerbated psychiatric condition due to the UTI. Patient may need psychiatric admission if her behavioral state does not improve with treatment for UTI, which it has in the past. Will continue to follow patient for now.     Per primary team, pt is medically cleared today, somewhat less confused, however still changes topics quickly. Pt is circumstantial and talkative on interview but not pressured, no overt manic or psychotic sx, no overt disorganization. A&Ox4. No PRNs, no recent agitation. Adherent with Seroquel.    DDX; Delirium 2/2 UTI  vs psychosis unspecified vs Bipolar disorder current episode manic      Plan:  1. c/w constant observation due to recent confusion and erratic behavior/agitation prior to admission  2. Psych: can c/w seroquel 100 mg qhs  3. PRNs: Haldol 5mg PO/IM/IV PRN q6h for agitation if qtc <500  4. Psych to continue to follow. will evaluate tomorrow for need for inpt psych vs dispo home as pt's mental status appears to be improving.  5. Case discussed with primary team.    Addendum: Dr. Lara CL attending: Pt was not seen in person due to infection mitigation for the novel COVID-19 pandemic. I agree with the above assessment and plan.

## 2020-05-03 NOTE — PROGRESS NOTE ADULT - SUBJECTIVE AND OBJECTIVE BOX
****************INCOMPLETE NOTE***********************  FLAVIA SNELL  55y  Female      Subjective:     No acute overnight events.       Meds    MEDICATIONS  (STANDING):  atorvastatin 10 milliGRAM(s) Oral at bedtime  cephalexin 500 milliGRAM(s) Oral four times a day  dextrose 5%. 1000 milliLiter(s) (50 mL/Hr) IV Continuous <Continuous>  dextrose 50% Injectable 12.5 Gram(s) IV Push once  dextrose 50% Injectable 25 Gram(s) IV Push once  dextrose 50% Injectable 25 Gram(s) IV Push once  enoxaparin Injectable 40 milliGRAM(s) SubCutaneous daily  insulin lispro (HumaLOG) corrective regimen sliding scale   SubCutaneous three times a day before meals  QUEtiapine 100 milliGRAM(s) Oral at bedtime    MEDICATIONS  (PRN):  acetaminophen   Tablet .. 650 milliGRAM(s) Oral every 6 hours PRN Temp greater or equal to 38C (100.4F), Mild Pain (1 - 3), Moderate Pain (4 - 6)  dextrose 40% Gel 15 Gram(s) Oral once PRN Blood Glucose LESS THAN 70 milliGRAM(s)/deciliter  glucagon  Injectable 1 milliGRAM(s) IntraMuscular once PRN Glucose LESS THAN 70 milligrams/deciliter  haloperidol     Tablet 5 milliGRAM(s) Oral every 6 hours PRN anxiety/agitation  haloperidol    Injectable 5 milliGRAM(s) IntraMuscular every 6 hours PRN Agitation  LORazepam     Tablet 2 milliGRAM(s) Oral every 6 hours PRN Anxiety/agitation  LORazepam   Injectable 2 milliGRAM(s) IV Push every 6 hours PRN Anxiety, agitation        Vital Signs Last 24 Hrs  T(C): 36.4 (02 May 2020 22:18), Max: 37.1 (02 May 2020 10:30)  T(F): 97.6 (02 May 2020 22:18), Max: 98.7 (02 May 2020 10:30)  HR: 71 (02 May 2020 22:18) (70 - 71)  BP: 127/76 (02 May 2020 22:18) (127/76 - 130/80)  BP(mean): --  RR: 18 (02 May 2020 22:18) (18 - 18)  SpO2: 98% (02 May 2020 22:18) (98% - 98%)    PHYSICAL EXAM:  GENERAL: NAD, well-groomed, well-developed  HEENT - NC/AT, pupils equal and reactive to light,  ; Moist mucous membranes, Good dentition, No lesions  NECK: Supple, No JVD  CHEST/LUNG: Clear to auscultation bilaterally; No rales, rhonchi, wheezing  HEART: Regular rate and rhythm; No murmurs, rubs, or gallops  ABDOMEN: Soft, Nontender, Nondistended; Bowel sounds present  EXTREMITIES:  2+ Peripheral Pulses, No clubbing, cyanosis, or edema  NEURO:  No Focal deficits, sensory and motor intact  SKIN: No rashes or lesions    Consultant(s) Notes Reviewed:  [x ] YES  [ ] NO  Care Discussed with Consultants/Other Providers [ x] YES  [ ] NO    LABS:          RADIOLOGY & ADDITIONAL TESTS: ****************INCOMPLETE NOTE***********************  FLAVIA SNELL  55y  Female      Subjective:     No acute overnight events.     ROS neg for CP, SOB, f/c.    Meds    MEDICATIONS  (STANDING):  atorvastatin 10 milliGRAM(s) Oral at bedtime  cephalexin 500 milliGRAM(s) Oral four times a day  dextrose 5%. 1000 milliLiter(s) (50 mL/Hr) IV Continuous <Continuous>  dextrose 50% Injectable 12.5 Gram(s) IV Push once  dextrose 50% Injectable 25 Gram(s) IV Push once  dextrose 50% Injectable 25 Gram(s) IV Push once  enoxaparin Injectable 40 milliGRAM(s) SubCutaneous daily  insulin lispro (HumaLOG) corrective regimen sliding scale   SubCutaneous three times a day before meals  QUEtiapine 100 milliGRAM(s) Oral at bedtime    MEDICATIONS  (PRN):  acetaminophen   Tablet .. 650 milliGRAM(s) Oral every 6 hours PRN Temp greater or equal to 38C (100.4F), Mild Pain (1 - 3), Moderate Pain (4 - 6)  dextrose 40% Gel 15 Gram(s) Oral once PRN Blood Glucose LESS THAN 70 milliGRAM(s)/deciliter  glucagon  Injectable 1 milliGRAM(s) IntraMuscular once PRN Glucose LESS THAN 70 milligrams/deciliter  haloperidol     Tablet 5 milliGRAM(s) Oral every 6 hours PRN anxiety/agitation  haloperidol    Injectable 5 milliGRAM(s) IntraMuscular every 6 hours PRN Agitation  LORazepam     Tablet 2 milliGRAM(s) Oral every 6 hours PRN Anxiety/agitation  LORazepam   Injectable 2 milliGRAM(s) IV Push every 6 hours PRN Anxiety, agitation        Vital Signs Last 24 Hrs  T(C): 36.4 (02 May 2020 22:18), Max: 37.1 (02 May 2020 10:30)  T(F): 97.6 (02 May 2020 22:18), Max: 98.7 (02 May 2020 10:30)  HR: 71 (02 May 2020 22:18) (70 - 71)  BP: 127/76 (02 May 2020 22:18) (127/76 - 130/80)  BP(mean): --  RR: 18 (02 May 2020 22:18) (18 - 18)  SpO2: 98% (02 May 2020 22:18) (98% - 98%)    PHYSICAL EXAM:  GENERAL: NAD, well-groomed, well-developed  HEENT - NC/AT, pupils equal and reactive to light,  ; Moist mucous membranes, Good dentition, No lesions  NECK: Supple, No JVD  CHEST/LUNG: Clear to auscultation bilaterally; No rales, rhonchi, wheezing  HEART: Regular rate and rhythm; No murmurs, rubs, or gallops  ABDOMEN: Soft, Nontender, Nondistended; Bowel sounds present  EXTREMITIES:  2+ Peripheral Pulses, No clubbing, cyanosis, or edema  NEURO:  No Focal deficits, sensory and motor intact  SKIN: No rashes or lesions    Consultant(s) Notes Reviewed:  [x ] YES  [ ] NO  Care Discussed with Consultants/Other Providers [ x] YES  [ ] NO    LABS:          RADIOLOGY & ADDITIONAL TESTS: FLAVIA SNELL  55y  Female      Subjective:     No acute overnight events. This am, patient states that she has been particularly worried about her mother's health, especially after she had a heart attack this past week. She denies any f/c, cough/SOB, abdominal pain, dysuria.    ROS neg for CP, SOB, f/c.    Meds    MEDICATIONS  (STANDING):  atorvastatin 10 milliGRAM(s) Oral at bedtime  cephalexin 500 milliGRAM(s) Oral four times a day  dextrose 5%. 1000 milliLiter(s) (50 mL/Hr) IV Continuous <Continuous>  dextrose 50% Injectable 12.5 Gram(s) IV Push once  dextrose 50% Injectable 25 Gram(s) IV Push once  dextrose 50% Injectable 25 Gram(s) IV Push once  enoxaparin Injectable 40 milliGRAM(s) SubCutaneous daily  insulin lispro (HumaLOG) corrective regimen sliding scale   SubCutaneous three times a day before meals  QUEtiapine 100 milliGRAM(s) Oral at bedtime    MEDICATIONS  (PRN):  acetaminophen   Tablet .. 650 milliGRAM(s) Oral every 6 hours PRN Temp greater or equal to 38C (100.4F), Mild Pain (1 - 3), Moderate Pain (4 - 6)  dextrose 40% Gel 15 Gram(s) Oral once PRN Blood Glucose LESS THAN 70 milliGRAM(s)/deciliter  glucagon  Injectable 1 milliGRAM(s) IntraMuscular once PRN Glucose LESS THAN 70 milligrams/deciliter  haloperidol     Tablet 5 milliGRAM(s) Oral every 6 hours PRN anxiety/agitation  haloperidol    Injectable 5 milliGRAM(s) IntraMuscular every 6 hours PRN Agitation  LORazepam     Tablet 2 milliGRAM(s) Oral every 6 hours PRN Anxiety/agitation  LORazepam   Injectable 2 milliGRAM(s) IV Push every 6 hours PRN Anxiety, agitation        Vital Signs Last 24 Hrs  T(C): 36.4 (02 May 2020 22:18), Max: 37.1 (02 May 2020 10:30)  T(F): 97.6 (02 May 2020 22:18), Max: 98.7 (02 May 2020 10:30)  HR: 71 (02 May 2020 22:18) (70 - 71)  BP: 127/76 (02 May 2020 22:18) (127/76 - 130/80)  BP(mean): --  RR: 18 (02 May 2020 22:18) (18 - 18)  SpO2: 98% (02 May 2020 22:18) (98% - 98%)    PHYSICAL EXAM:  GENERAL: NAD, well-groomed, well-developed  CHEST/LUNG: Clear to auscultation bilaterally; No rales, rhonchi, wheezing  HEART: Regular rate and rhythm; No murmurs, rubs, or gallops  ABDOMEN: Soft, Nontender, Nondistended; Bowel sounds present  EXTREMITIES:  2+ Peripheral Pulses, No clubbing, cyanosis, or edema  NEURO:  No Focal deficits, sensory and motor intact  PSYCH: Normal mood, reactive affect, changes subjects quickly, redirect in speech  SKIN: No rashes or lesions    Consultant(s) Notes Reviewed:  [x ] YES  [ ] NO  Care Discussed with Consultants/Other Providers [ x] YES  [ ] NO    LABS:          RADIOLOGY & ADDITIONAL TESTS:

## 2020-05-03 NOTE — DISCHARGE NOTE PROVIDER - HOSPITAL COURSE
55 year old female with major depressive disorder, general anxiety disorder, history of UTI induced psychosis, DM II on metformin, HTN and hyperlipidemia, brought in by EMS for bizarre behavior for the past 3-4 weeks. Admitted for psychosis/ tina in the setting of discontinued psychiatric medication, as well as infection (UTI and recent COVID infection).  Patient found to be covid positive, but without respiratory symptoms. She was seen by psychiatry and started on seroquel. Patient was deemed to not have capacity to leave [place] and was pending discharge to [place] for inpatient psychiatry. 55 year old female with major depressive disorder, general anxiety disorder, history of UTI induced psychosis, DM II on metformin, HTN and hyperlipidemia, brought in by EMS for bizarre behavior for the past 3-4 weeks. Admitted for psychosis/ tina in the setting of discontinued psychiatric medication, as well as infection (UTI and recent COVID infection).  Patient found to be covid positive, but without respiratory symptoms. She was seen by psychiatry and started on seroquel. Patient was deemed to not have capacity to leave [place], however family is amenable to patient returning home w/ follow up at [facility], where she received long term outpatient psychiatric care. Patient was cleared by psychiatry to be discharged home w/ appropriate follow up, no need for inpatient psychiatric care. Patient is safe and stable for discharge.

## 2020-05-03 NOTE — PROGRESS NOTE ADULT - PROBLEM SELECTOR PLAN 1
Patient with psychosis, multifactorial 2/2 psych med discontinuation, infection (UTI and COVID)  - CTH without acute changes   - TSH wnl, Tox Screen Negative, Vitamin B12 and Folate wnl.  - UA concerning for UTI, UCx growing group B strep  - Psych following appreciate recs. Patient does not have capacity to leave [facility].  - Seroquel 100 mg qhs  - Haldol and Ativan prn per psych  - will eventually go to [place] inpatient psych d/t COVID diagnosis Patient with psychosis, multifactorial 2/2 psych med discontinuation, infection (UTI and COVID)  - Medical w/u negative: CTH without acute changes, TSH wnl, Tox Screen Negative, Vitamin B12 and Folate wnl.  - UA concerning for UTI, UCx growing group B strep, s/p abx  - Psych following appreciate recs. Patient does not have capacity to leave [facility].  - Seroquel 100 mg qhs  - Haldol and Ativan prn per psych  - will eventually go to [place] inpatient psych d/t COVID diagnosis

## 2020-05-04 ENCOUNTER — TRANSCRIPTION ENCOUNTER (OUTPATIENT)
Age: 55
End: 2020-05-04

## 2020-05-04 VITALS
SYSTOLIC BLOOD PRESSURE: 144 MMHG | TEMPERATURE: 97 F | RESPIRATION RATE: 18 BRPM | OXYGEN SATURATION: 100 % | DIASTOLIC BLOOD PRESSURE: 61 MMHG | HEART RATE: 66 BPM

## 2020-05-04 PROCEDURE — 99231 SBSQ HOSP IP/OBS SF/LOW 25: CPT | Mod: GC

## 2020-05-04 PROCEDURE — 99233 SBSQ HOSP IP/OBS HIGH 50: CPT

## 2020-05-04 RX ORDER — QUETIAPINE FUMARATE 200 MG/1
1 TABLET, FILM COATED ORAL
Qty: 30 | Refills: 0
Start: 2020-05-04 | End: 2020-06-02

## 2020-05-04 RX ORDER — CEPHALEXIN 500 MG
1 CAPSULE ORAL
Qty: 8 | Refills: 0
Start: 2020-05-04 | End: 2020-05-05

## 2020-05-04 RX ADMIN — Medication 500 MILLIGRAM(S): at 06:48

## 2020-05-04 RX ADMIN — Medication 500 MILLIGRAM(S): at 17:10

## 2020-05-04 RX ADMIN — Medication 0: at 17:10

## 2020-05-04 RX ADMIN — Medication 0: at 07:58

## 2020-05-04 RX ADMIN — Medication 500 MILLIGRAM(S): at 11:55

## 2020-05-04 RX ADMIN — ENOXAPARIN SODIUM 40 MILLIGRAM(S): 100 INJECTION SUBCUTANEOUS at 11:43

## 2020-05-04 RX ADMIN — Medication 0: at 11:55

## 2020-05-04 NOTE — PROGRESS NOTE ADULT - SUBJECTIVE AND OBJECTIVE BOX
PROGRESS NOTE:     Patient is a 55y old  Female who presents with a chief complaint of Pasychosis/ Psych Eval (03 May 2020 23:05)      SUBJECTIVE / OVERNIGHT EVENTS:        MEDICATIONS  (STANDING):  atorvastatin 10 milliGRAM(s) Oral at bedtime  cephalexin 500 milliGRAM(s) Oral four times a day  dextrose 5%. 1000 milliLiter(s) (50 mL/Hr) IV Continuous <Continuous>  dextrose 50% Injectable 12.5 Gram(s) IV Push once  dextrose 50% Injectable 25 Gram(s) IV Push once  dextrose 50% Injectable 25 Gram(s) IV Push once  enoxaparin Injectable 40 milliGRAM(s) SubCutaneous daily  insulin lispro (HumaLOG) corrective regimen sliding scale   SubCutaneous three times a day before meals  QUEtiapine 100 milliGRAM(s) Oral at bedtime    MEDICATIONS  (PRN):  acetaminophen   Tablet .. 650 milliGRAM(s) Oral every 6 hours PRN Temp greater or equal to 38C (100.4F), Mild Pain (1 - 3), Moderate Pain (4 - 6)  dextrose 40% Gel 15 Gram(s) Oral once PRN Blood Glucose LESS THAN 70 milliGRAM(s)/deciliter  glucagon  Injectable 1 milliGRAM(s) IntraMuscular once PRN Glucose LESS THAN 70 milligrams/deciliter  haloperidol     Tablet 5 milliGRAM(s) Oral every 6 hours PRN anxiety/agitation  haloperidol    Injectable 5 milliGRAM(s) IntraMuscular every 6 hours PRN Agitation  LORazepam     Tablet 2 milliGRAM(s) Oral every 6 hours PRN Anxiety/agitation  LORazepam   Injectable 2 milliGRAM(s) IV Push every 6 hours PRN Anxiety, agitation      CAPILLARY BLOOD GLUCOSE      POCT Blood Glucose.: 96 mg/dL (03 May 2020 21:54)  POCT Blood Glucose.: 92 mg/dL (03 May 2020 17:09)  POCT Blood Glucose.: 92 mg/dL (03 May 2020 11:36)  POCT Blood Glucose.: 93 mg/dL (03 May 2020 08:10)    I&O's Summary      PHYSICAL EXAM:  Vital Signs Last 24 Hrs  T(C): 36.5 (04 May 2020 06:54), Max: 36.7 (03 May 2020 18:06)  T(F): 97.7 (04 May 2020 06:54), Max: 98.1 (03 May 2020 18:06)  HR: 64 (04 May 2020 06:54) (64 - 75)  BP: 122/66 (04 May 2020 06:54) (119/77 - 132/67)  BP(mean): --  RR: 18 (04 May 2020 06:54) (18 - 19)  SpO2: 100% (04 May 2020 06:54) (96% - 100%)    CONSTITUTIONAL: NAD, well-developed  RESPIRATORY: Normal respiratory effort; lungs are clear to auscultation bilaterally  CARDIOVASCULAR: Regular rate and rhythm, normal S1 and S2, no murmur/rub/gallop; No lower extremity edema; Peripheral pulses are 2+ bilaterally  ABDOMEN: Nontender to palpation, normoactive bowel sounds, no rebound/guarding; No hepatosplenomegaly  MUSCLOSKELETAL: no clubbing or cyanosis of digits; no joint swelling or tenderness to palpation  NEURO: CN 2-12 grossly intact, moves all limbs spontaneously  PSYCH: A+O to person, place, and time; affect appropriate    LABS:                      RADIOLOGY & ADDITIONAL TESTS:  Results Reviewed:   Imaging Personally Reviewed:  Electrocardiogram Personally Reviewed:    COORDINATION OF CARE:  Care Discussed with Consultants/Other Providers [Y/N]:  Prior or Outpatient Records Reviewed [Y/N]: PROGRESS NOTE:     Patient is a 55y old  Female who presents with a chief complaint of Pasychosis/ Psych Eval (03 May 2020 23:05)      SUBJECTIVE / OVERNIGHT EVENTS: Patient seen and examined at bedside w/ no subjective complaints.         MEDICATIONS  (STANDING):  atorvastatin 10 milliGRAM(s) Oral at bedtime  cephalexin 500 milliGRAM(s) Oral four times a day  dextrose 5%. 1000 milliLiter(s) (50 mL/Hr) IV Continuous <Continuous>  dextrose 50% Injectable 12.5 Gram(s) IV Push once  dextrose 50% Injectable 25 Gram(s) IV Push once  dextrose 50% Injectable 25 Gram(s) IV Push once  enoxaparin Injectable 40 milliGRAM(s) SubCutaneous daily  insulin lispro (HumaLOG) corrective regimen sliding scale   SubCutaneous three times a day before meals  QUEtiapine 100 milliGRAM(s) Oral at bedtime    MEDICATIONS  (PRN):  acetaminophen   Tablet .. 650 milliGRAM(s) Oral every 6 hours PRN Temp greater or equal to 38C (100.4F), Mild Pain (1 - 3), Moderate Pain (4 - 6)  dextrose 40% Gel 15 Gram(s) Oral once PRN Blood Glucose LESS THAN 70 milliGRAM(s)/deciliter  glucagon  Injectable 1 milliGRAM(s) IntraMuscular once PRN Glucose LESS THAN 70 milligrams/deciliter  haloperidol     Tablet 5 milliGRAM(s) Oral every 6 hours PRN anxiety/agitation  haloperidol    Injectable 5 milliGRAM(s) IntraMuscular every 6 hours PRN Agitation  LORazepam     Tablet 2 milliGRAM(s) Oral every 6 hours PRN Anxiety/agitation  LORazepam   Injectable 2 milliGRAM(s) IV Push every 6 hours PRN Anxiety, agitation      CAPILLARY BLOOD GLUCOSE      POCT Blood Glucose.: 96 mg/dL (03 May 2020 21:54)  POCT Blood Glucose.: 92 mg/dL (03 May 2020 17:09)  POCT Blood Glucose.: 92 mg/dL (03 May 2020 11:36)  POCT Blood Glucose.: 93 mg/dL (03 May 2020 08:10)    I&O's Summary      PHYSICAL EXAM:  Vital Signs Last 24 Hrs  T(C): 36.5 (04 May 2020 06:54), Max: 36.7 (03 May 2020 18:06)  T(F): 97.7 (04 May 2020 06:54), Max: 98.1 (03 May 2020 18:06)  HR: 64 (04 May 2020 06:54) (64 - 75)  BP: 122/66 (04 May 2020 06:54) (119/77 - 132/67)  BP(mean): --  RR: 18 (04 May 2020 06:54) (18 - 19)  SpO2: 100% (04 May 2020 06:54) (96% - 100%)    CONSTITUTIONAL: NAD, well-developed  RESPIRATORY: Normal respiratory effort; lungs are clear to auscultation bilaterally  CARDIOVASCULAR: Regular rate and rhythm, normal S1 and S2  ABDOMEN: soft non tender non distended  MUSCLOSKELETAL: no swelling in extremities  NEURO: non focal  PSYCH: A+O to person, place, and time; affect appropriate

## 2020-05-04 NOTE — PROGRESS NOTE BEHAVIORAL HEALTH - RISK ASSESSMENT
Acute risk factors: mood episode, agitation, insomnia, aggressive behavior, impulsivity, recent discontinuation of medication  Chronic risk: prior psych hx, non-compliance/not in treatment, lacks insight, poor judgment  Protective: social supports, no prior history of suicide attempts or violence, no legal history, no substance abuse, , female gender

## 2020-05-04 NOTE — DISCHARGE NOTE NURSING/CASE MANAGEMENT/SOCIAL WORK - PATIENT PORTAL LINK FT
You can access the FollowMyHealth Patient Portal offered by [facility] by registering at the following website: [email or web address]. By joining [facility]’s FollowMyHealth portal, you will also be able to view your health information using other applications (apps) compatible with our system.

## 2020-05-04 NOTE — PROGRESS NOTE BEHAVIORAL HEALTH - CASE SUMMARY
patient denies ah/vh, delusions or paranoia, has not been agitated or needed PRNS for >3d, per staff in good behavior patient admits "when I get a UTI, I get all confused, is there anyway to prevent these recurrent UTI's?", denies si/i/p o hi/i/p. OK to d/c to home, would continue seroquel as above which patient agrees with, needs psych f/u within 2-3 weeks. Family in agreement.

## 2020-05-04 NOTE — PROGRESS NOTE BEHAVIORAL HEALTH - NSBHCONSULTFOLLOWAFTERCARE_PSY_A_CORE FT
Patient may follow up at [facility].    [facility] (walkin, Monday-Friday) [phone] Patient may follow up at [facility]: [phone]    [facility] (walk-in, Monday-Friday) [phone]

## 2020-05-04 NOTE — PROGRESS NOTE ADULT - PROBLEM SELECTOR PLAN 1
Patient with psychosis, multifactorial 2/2 psych med discontinuation, infection (UTI and COVID)  - Medical w/u negative: CTH without acute changes, TSH wnl, Tox Screen Negative, Vitamin B12 and Folate wnl.  - UA concerning for UTI, UCx growing group B strep, s/p abx  - Psych following appreciate recs. Patient does not have capacity to leave AMA.  - Seroquel 100 mg qhs  - Haldol and Ativan prn per psych  - will eventually go to [facility] inpatient psych d/t COVID diagnosis Patient with psychosis, multifactorial 2/2 psych med discontinuation, infection (UTI and COVID)  - Medical w/u negative: CTH without acute changes, TSH wnl, Tox Screen Negative, Vitamin B12 and Folate wnl.  - UA concerning for UTI, UCx growing group B strep, s/p abx  - Psych following appreciate recs. Patient does not have capacity to leave AMA.  - Seroquel 100 mg qhs  - Haldol and Ativan prn per psych  - d/c home w/ appropriate f/u as per psych

## 2020-05-04 NOTE — PROGRESS NOTE BEHAVIORAL HEALTH - AXIS III
HLD, HTN, pre-diabetes,osteopenia, hysterectomy

## 2020-05-04 NOTE — PROGRESS NOTE BEHAVIORAL HEALTH - SUMMARY
55 year old  female living with  and two children, prior psychiatric history of mood disorder, recurrent depression, one manic episode 1.5 years ago, history of UTI related psychosis in 2016, short courses of outpatient treatment, last saw a therapist at [facility] September 2019 while on Lexapro, recent COVID+ a month ago, was brought in by EMS Called by family for erratic behavior in setting of multiple stressors and stopping Lexapro a month ago.    Patient with increasingly erratic behavior at home including taking out $50,000 from bank with plan to run away. Upon initial presentation to ED, showed symptoms consistent with tina, agitation, aggression, impulsivity, insomnia, increase in goal-oriented behavior and is preoccupied with wanting to divorce . Patient was found to have UTI, and was admitted to medicine for treatment of UTI, now on IV antibiotics. This could be similar to prior episode in 2016 of a delirium picture on top of underlying exacerbated psychiatric condition due to the UTI. Patient is on seroquel 100mg qhs, has had improvement in symptoms, likely will not need inpatient hospitalization at this time.     DDX; Delirium 2/2 UTI  vs psychosis unspecified vs Bipolar disorder current episode manic    PLAN:  See below  Patient cannot leave AMA. 55 year old  female living with  and two children, prior psychiatric history of mood disorder, recurrent depression, one manic episode 1.5 years ago, history of UTI related psychosis in 2016, short courses of outpatient treatment, last saw a therapist at [facility] September 2019 while on Lexapro, recent COVID+ a month ago, was brought in by EMS Called by family for erratic behavior in setting of multiple stressors and stopping Lexapro a month ago.    Patient with increasingly erratic behavior at home including taking out $50,000 from bank with plan to run away. Upon initial presentation to ED, showed symptoms consistent with tina, agitation, aggression, impulsivity, insomnia, increase in goal-oriented behavior and is preoccupied with wanting to divorce . Patient was found to have UTI, and was admitted to medicine for treatment of UTI, now on IV antibiotics. This could be similar to prior episode in 2016 of a delirium picture on top of underlying exacerbated psychiatric condition due to the UTI. Patient is on seroquel 100mg qhs, has had improvement in symptoms, at baseline mental status, will not require inpatient hospitalization. Is psychiatrically cleared for discharge    DDX; Delirium 2/2 UTI  vs psychosis unspecified vs Bipolar disorder current episode manic    PLAN:  See below  Patient cannot leave AMA. 55 year old  female living with  and two children, prior psychiatric history of mood disorder, recurrent depression, one manic episode 1.5 years ago, history of UTI related psychosis in 2016, short courses of outpatient treatment, last saw a therapist at [facility] September 2019 while on Lexapro, recent COVID+ a month ago, was brought in by EMS Called by family for erratic behavior in setting of multiple stressors and stopping Lexapro a month ago.    Patient with increasingly erratic behavior at home including taking out $50,000 from bank with plan to run away. Upon initial presentation to ED, showed symptoms consistent with tina, agitation, aggression, impulsivity, insomnia, increase in goal-oriented behavior and is preoccupied with wanting to divorce . Patient was found to have UTI, and was admitted to medicine for treatment of UTI, now on IV antibiotics. This could be similar to prior episode in 2016 of a delirium picture on top of underlying exacerbated psychiatric condition due to the UTI.     Patient is on seroquel 100mg qhs, has had improvement in symptoms, at baseline mental status, will not require inpatient hospitalization at this time. Is psychiatrically cleared for discharge. Family aware and comfortable with this, agrees to f/u psych care.     DDX; Delirium 2/2 UTI  vs psychosis unspecified vs Bipolar disorder current episode manic

## 2020-05-04 NOTE — PROGRESS NOTE BEHAVIORAL HEALTH - NSBHCONSULTOBS_PSY_A_CORE
----- Message from Jenae Martínez sent at 12/26/2018  1:22 PM CST -----  Contact: Pt's   Pt called and had general questions about chemo and future lab appt already set for 12/31  Contact # [phone]   Constant observation Routine observation

## 2020-05-04 NOTE — PROGRESS NOTE BEHAVIORAL HEALTH - NSBHFUPINTERVALHXFT_PSY_A_CORE
Patient assessed by Dr. Esposito (attg) at bedside with telephonic input from Dr. Sanabria in order to mitigate infection risk due to COVID.     Patient has been in good behavioral control, has received seroquel 100mg PO qhs, has not require PRNs.       Spoke with daughter, Luann Vu ([phone]) who states  she thinks her mother is back to baseline. She spoke with patient over the weekend, and patient admitted to everything she had done, understood that what she did was wrong. Family is comfortable with patient returning home. Daughter states patient and  plan on seeing a couple's therapist, and patient will follow up at [facility] where she has gone in the past. Patient assessed by Dr. Esposito (attg) at bedside with telephonic input from Dr. Sanabria in order to mitigate infection risk due to COVID. Patient states whenever she has a UTI she has symptoms similar to how she presented. She asks if there is anything she can do/take to prevent future UTIs. She reports no changes in mood, displays no symptoms of tina or psychosis, no AVH, no suicidal/homicidal thoughts, no paranoia.  Patient has been in good behavioral control, has received seroquel 100mg PO qhs, has not require PRNs.       Spoke with daughter, Luann Vu ([phone]) who states  she thinks her mother is back to baseline. She spoke with patient over the weekend, and patient admitted to everything she had done, understood that what she did was wrong. Family is comfortable with patient returning home. Daughter states patient and  plan on seeing a couple's therapist, and patient will follow up at [facility] where she has gone in the past. Patient assessed by Dr. Esposito (attg) at bedside with telephonic input from Dr. Sanabria in order to mitigate infection risk due to COVID. Patient states whenever she has a UTI she has symptoms similar to how she presented. She asks if there is anything she can do/take to prevent future UTIs. She reports no changes in mood, displays no symptoms of tina or psychosis, no AVH, no suicidal/homicidal thoughts, no paranoia.    Patient has been in good behavioral control, has received seroquel 100mg PO qhs, has not require PRNs. Patient agrees to continue seroquel qHS along with PO abx for the near future until f/u with psych.     Spoke with daughter, Luann Vu ([phone]) who states  she thinks her mother is back to baseline. She spoke with patient over the weekend, and patient admitted to everything she had done, understood that what she did was wrong. Family is comfortable with patient returning home. Daughter states patient and  plan on seeing a couple's therapist, and patient will follow up at [facility] where she has gone in the past.

## 2020-05-04 NOTE — PROGRESS NOTE BEHAVIORAL HEALTH - NSBHCONSULTFOLLOWDETAILS_PSY_A_CORE FT
Behaviors are steadily improving with UTI treatment, will likely not need inpatient hospitalization, pt. CANNOT leave AMA , will continue to follow closely Patient's symptoms improved with treatment of UTI, patient is cleared from a psychiatric standpoint for discharge home.

## 2020-06-15 PROBLEM — F99 MENTAL DISORDER, NOT OTHERWISE SPECIFIED: Chronic | Status: ACTIVE | Noted: 2020-04-29

## 2020-06-15 PROBLEM — F32.9 MAJOR DEPRESSIVE DISORDER, SINGLE EPISODE, UNSPECIFIED: Chronic | Status: ACTIVE | Noted: 2020-04-29

## 2020-06-15 PROBLEM — E11.9 TYPE 2 DIABETES MELLITUS WITHOUT COMPLICATIONS: Chronic | Status: ACTIVE | Noted: 2020-04-29

## 2020-06-15 PROBLEM — I10 ESSENTIAL (PRIMARY) HYPERTENSION: Chronic | Status: ACTIVE | Noted: 2020-04-29

## 2020-06-15 PROBLEM — F41.1 GENERALIZED ANXIETY DISORDER: Chronic | Status: ACTIVE | Noted: 2020-04-29

## 2020-06-16 ENCOUNTER — APPOINTMENT (OUTPATIENT)
Dept: UROLOGY | Facility: CLINIC | Age: 55
End: 2020-06-16

## 2020-06-18 ENCOUNTER — APPOINTMENT (OUTPATIENT)
Dept: UROLOGY | Facility: CLINIC | Age: 55
End: 2020-06-18
Payer: COMMERCIAL

## 2020-06-18 VITALS — TEMPERATURE: 98.1 F

## 2020-06-18 DIAGNOSIS — Z87.440 PERSONAL HISTORY OF URINARY (TRACT) INFECTIONS: ICD-10-CM

## 2020-06-18 PROCEDURE — 99204 OFFICE O/P NEW MOD 45 MIN: CPT

## 2020-06-18 RX ORDER — TRIMETHOPRIM 100 MG/1
100 TABLET ORAL
Qty: 90 | Refills: 3 | Status: ACTIVE | COMMUNITY
Start: 2020-06-18 | End: 1900-01-01

## 2020-06-18 NOTE — LETTER BODY
[Dear  ___] : Dear  [unfilled], [Consult Letter:] : I had the pleasure of evaluating your patient, [unfilled]. [Sincerely,] : Sincerely, [Please see my note below.] : Please see my note below. [FreeTextEntry1] : 06/18/2020: 55 year old female presents today for an initial evaluation. Pt has frequent UTIs. Pt's aunt had breast cancer and ovarian cancer. Passed age 32. Had mastectomy. Pt has mood symptoms and may have underlining bipolar discord. Pt's daughter does not have UTIs. PCP, Dr. De advised pt to see urologist about symptoms. PVR today was 9mL. Pt has had two children and one vaginal birth. Denies headache, asthma, chest pains, cough, constipation, vaginal discharge and trouble with hearing. Pt had a partial hysterectomy 8-10 years ago. Vaginal was reconstructed and ovaries were removed. Only burning when she has UTI. No HX of drinking or smoking. No FMHx of seizures or stones. No skin problems. No aspirin or blood thinners. Had menopause and hot flashes have stopped. 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 Pt tested negative for colon and breast cancers. \par \par Pt was advised to increase fluid intake and to have some cranberry juice. \par \par Prescribed Bactrim and Trimethoprim. \par \par Pt will provide a urine sample today for culture, cytology and analysis. Bloodwork today will include CBC, and CMP.\par \par Scheduled pt for a CT scan and cystoscopy for 1-2 weeks.  [FreeTextEntry2] : Ralf cMmullen\par [address], [address]\[place] [address] [FreeTextEntry3] : Ralf Ribeiro MD, PhD\par [address] [place] [address]\[place] [phone]

## 2020-06-18 NOTE — LETTER HEADER
[FreeTextEntry3] : Ralf Ribeiro MD, PhD\par [address] [place] [address]\[place] [id number]

## 2020-06-18 NOTE — ASSESSMENT
[FreeTextEntry1] : 06/18/2020: 55 year old female presents today for an initial evaluation. Pt has frequent UTIs. Pt's aunt had breast cancer and ovarian cancer. Passed age 32. Had mastectomy. Pt has mood symptoms and may have underlining bipolar discord. Pt's daughter does not have UTIs. PCP, Dr. De advised pt to see urologist about symptoms. PVR today was 9mL. Pt has had two children and one vaginal birth. Denies headache, asthma, chest pains, cough, constipation, vaginal discharge and trouble with hearing. Pt had a partial hysterectomy 8-10 years ago. Vaginal was reconstructed and ovaries were removed. Only burning when she has UTI. No HX of drinking or smoking. No FMHx of seizures or stones. No skin problems. No aspirin or blood thinners. Had menopause and hot flashes have stopped. 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 Pt tested negative for colon and breast cancers. \par \par Pt was advised to increase fluid intake and to have some cranberry juice. \par \par Prescribed  Trimethoprim 100 mg q hs.. \par \par Pt will provide a urine sample today for culture, cytology and analysis. Blood work today will include CBC, and CMP.\par \par Scheduled pt for a CT scan and cystoscopy for 1-2 weeks.

## 2020-06-18 NOTE — ADDENDUM
[FreeTextEntry1] : This note was authored by Heidi Melissa working as scribe for Dr. Ralf Ribeiro. I, Dr. Ralf Ribeiro, have reviewed the content of this note and confirm it is true and accurate. I personally performed the history and physical examination and made all the decisions.\par 06/18/2020

## 2020-06-18 NOTE — PHYSICAL EXAM
[General Appearance - Well Developed] : well developed [General Appearance - Well Nourished] : well nourished [Normal Appearance] : normal appearance [General Appearance - In No Acute Distress] : no acute distress [Well Groomed] : well groomed [Respiration, Rhythm And Depth] : normal respiratory rhythm and effort [Edema] : no peripheral edema [Exaggerated Use Of Accessory Muscles For Inspiration] : no accessory muscle use [Abdomen Tenderness] : non-tender [Abdomen Soft] : soft [Normal Station and Gait] : the gait and station were normal for the patient's age [Costovertebral Angle Tenderness] : no ~M costovertebral angle tenderness [Urinary Bladder Findings] : the bladder was normal on palpation [] : no rash [No Focal Deficits] : no focal deficits [Oriented To Time, Place, And Person] : oriented to person, place, and time [Affect] : the affect was normal [Not Anxious] : not anxious [Mood] : the mood was normal [No Palpable Adenopathy] : no palpable adenopathy [FreeTextEntry1] : 2+ radial pulse BL

## 2020-06-18 NOTE — HISTORY OF PRESENT ILLNESS
[FreeTextEntry1] : 06/18/2020: 55 year old female presents today for an initial evaluation. Pt has frequent UTIs. Pt's aunt had breast cancer and ovarian cancer. Passed age 32. Had mastectomy. Pt has mood symptoms and may have underlining bipolar discord. Pt's daughter does not have UTIs. PCP, Dr. De advised pt to see urologist about symptoms. PVR today was 9mL. Pt has had two children both by vaginal birth. Denies headache, asthma, chest pains, cough, constipation, vaginal discharge and trouble with hearing. Pt had a partial hysterectomy 8-10 years ago. Vaginal was reconstructed and ovaries were removed. Only burning when she has UTI. No HX of drinking or smoking. No FMHx of seizures or stones. No skin problems. No aspirin or blood thinners. Had menopause and hot flashes have stopped. 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 Pt tested negative for colon and breast cancers. On seroquel for bipolar disorder.

## 2020-06-21 LAB — BACTERIA UR CULT: NORMAL

## 2020-06-22 DIAGNOSIS — D72.829 ELEVATED WHITE BLOOD CELL COUNT, UNSPECIFIED: ICD-10-CM

## 2020-06-22 LAB
ALBUMIN SERPL ELPH-MCNC: 4.7 G/DL
ALP BLD-CCNC: 66 U/L
ALT SERPL-CCNC: 21 U/L
ANION GAP SERPL CALC-SCNC: 16 MMOL/L
APPEARANCE: CLEAR
AST SERPL-CCNC: 19 U/L
BACTERIA: NEGATIVE
BASOPHILS # BLD AUTO: 0.09 K/UL
BASOPHILS NFR BLD AUTO: 0.7 %
BILIRUB SERPL-MCNC: 0.3 MG/DL
BILIRUBIN URINE: NEGATIVE
BLOOD URINE: NEGATIVE
BUN SERPL-MCNC: 31 MG/DL
CALCIUM SERPL-MCNC: 10.1 MG/DL
CHLORIDE SERPL-SCNC: 97 MMOL/L
CO2 SERPL-SCNC: 23 MMOL/L
COLOR: COLORLESS
CREAT SERPL-MCNC: 1.35 MG/DL
EOSINOPHIL # BLD AUTO: 0.29 K/UL
EOSINOPHIL NFR BLD AUTO: 2.4 %
GLUCOSE QUALITATIVE U: NEGATIVE
GLUCOSE SERPL-MCNC: 94 MG/DL
HCT VFR BLD CALC: 38.4 %
HGB BLD-MCNC: 12.3 G/DL
HYALINE CASTS: 0 /LPF
IMM GRANULOCYTES NFR BLD AUTO: 0.3 %
KETONES URINE: NEGATIVE
LEUKOCYTE ESTERASE URINE: ABNORMAL
LYMPHOCYTES # BLD AUTO: 3.55 K/UL
LYMPHOCYTES NFR BLD AUTO: 29 %
MAN DIFF?: NORMAL
MCHC RBC-ENTMCNC: 29.9 PG
MCHC RBC-ENTMCNC: 32 GM/DL
MCV RBC AUTO: 93.4 FL
MICROSCOPIC-UA: NORMAL
MONOCYTES # BLD AUTO: 1.47 K/UL
MONOCYTES NFR BLD AUTO: 12 %
NEUTROPHILS # BLD AUTO: 6.8 K/UL
NEUTROPHILS NFR BLD AUTO: 55.6 %
NITRITE URINE: NEGATIVE
PH URINE: 6
PLATELET # BLD AUTO: 360 K/UL
POTASSIUM SERPL-SCNC: 4 MMOL/L
PROT SERPL-MCNC: 7 G/DL
PROTEIN URINE: NEGATIVE
RBC # BLD: 4.11 M/UL
RBC # FLD: 14.3 %
RED BLOOD CELLS URINE: 1 /HPF
SODIUM SERPL-SCNC: 136 MMOL/L
SPECIFIC GRAVITY URINE: 1.01
SQUAMOUS EPITHELIAL CELLS: 3 /HPF
UROBILINOGEN URINE: NORMAL
WBC # FLD AUTO: 12.24 K/UL
WHITE BLOOD CELLS URINE: 2 /HPF

## 2020-06-23 LAB — URINE CYTOLOGY: NORMAL

## 2020-07-09 ENCOUNTER — APPOINTMENT (OUTPATIENT)
Dept: UROLOGY | Facility: CLINIC | Age: 55
End: 2020-07-09
Payer: COMMERCIAL

## 2020-07-09 ENCOUNTER — RESULT REVIEW (OUTPATIENT)
Age: 55
End: 2020-07-09

## 2020-07-09 ENCOUNTER — OUTPATIENT (OUTPATIENT)
Dept: OUTPATIENT SERVICES | Facility: HOSPITAL | Age: 55
LOS: 1 days | End: 2020-07-09
Payer: COMMERCIAL

## 2020-07-09 ENCOUNTER — APPOINTMENT (OUTPATIENT)
Dept: CT IMAGING | Facility: IMAGING CENTER | Age: 55
End: 2020-07-09
Payer: COMMERCIAL

## 2020-07-09 VITALS — RESPIRATION RATE: 16 BRPM | HEART RATE: 105 BPM | SYSTOLIC BLOOD PRESSURE: 128 MMHG | DIASTOLIC BLOOD PRESSURE: 75 MMHG

## 2020-07-09 VITALS — TEMPERATURE: 98.6 F

## 2020-07-09 DIAGNOSIS — N39.0 URINARY TRACT INFECTION, SITE NOT SPECIFIED: ICD-10-CM

## 2020-07-09 DIAGNOSIS — E83.52 HYPERCALCEMIA: ICD-10-CM

## 2020-07-09 DIAGNOSIS — Z90.710 ACQUIRED ABSENCE OF BOTH CERVIX AND UTERUS: Chronic | ICD-10-CM

## 2020-07-09 DIAGNOSIS — R35.0 FREQUENCY OF MICTURITION: ICD-10-CM

## 2020-07-09 PROCEDURE — 88112 CYTOPATH CELL ENHANCE TECH: CPT | Mod: 26

## 2020-07-09 PROCEDURE — 74178 CT ABD&PLV WO CNTR FLWD CNTR: CPT | Mod: 26

## 2020-07-09 PROCEDURE — 74178 CT ABD&PLV WO CNTR FLWD CNTR: CPT

## 2020-07-09 PROCEDURE — 99214 OFFICE O/P EST MOD 30 MIN: CPT | Mod: 25

## 2020-07-09 PROCEDURE — 52000 CYSTOURETHROSCOPY: CPT

## 2020-07-09 NOTE — LETTER HEADER
[FreeTextEntry3] : Ralf Ribeiro MD, PhD\par [address] [place] [address]\[place] [phone]

## 2020-07-09 NOTE — ASSESSMENT
[FreeTextEntry1] : 06/18/2020: 55 year old female presents today for an initial evaluation. Pt has frequent UTIs. Pt's aunt had breast cancer and ovarian cancer. Passed age 32. Had mastectomy. Pt has mood symptoms and may have underlining bipolar discord. Pt's daughter does not have UTIs. PCP, Dr. De advised pt to see urologist about symptoms. PVR today was 9mL. Pt has had two children and one vaginal birth. Denies headache, asthma, chest pains, cough, constipation, vaginal discharge and trouble with hearing. Pt had a partial hysterectomy 8-10 years ago. Vaginal was reconstructed and ovaries were removed. Only burning when she has UTI. No HX of drinking or smoking. No FMHx of seizures or stones. No skin problems. No aspirin or blood thinners. Had menopause and hot flashes have stopped. 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 Pt tested negative for colon and breast cancers. Pt was advised to increase fluid intake and to have some cranberry juice. Prescribed  Trimethoprim 100 mg qhs. Pt will provide a urine sample today for culture, cytology and analysis. Blood work today will include CBC, and CMP. Scheduled pt for a CT scan and cystoscopy for 1-2 weeks. \par \par 07/09/2020: Patient presents today for a cystoscopy.  Retroflex telescope. Bladder neck looks normal. Cystocele present. No bladder stones. Bladder has a large capacity. Both ureteral orifices have clear efflux, is slit and is at the normal position on the trigone. Metaplasia posteriorly on bladder neck and trigone. The inflammatory polyps are numerous, thin and peripheral. No ureteral stricture, or stones. On 06/18/2020 Pt Creatinine level 1.35 mg. Pt on Trimethoprim Qhs. Hx of Hysterectomy 6 years ago. CT Scan today reveals left fibroid that hasn’t been taken out,  no kidney stones found. NKDA.\par \par Pt will provide a urine sample today for culture, cytology and analysis. \par \par Schedule MRI . RTO in 1-2 weeks

## 2020-07-09 NOTE — ADDENDUM
[FreeTextEntry1] : This note was authored by Heidi Melissa working as scribe for Dr. Ralf Ribeiro. I, Dr. Ralf Ribeiro, have reviewed the content of this note and confirm it is true and accurate. I personally performed the history and physical examination and made all the decisions.\par 07/09/2020

## 2020-07-09 NOTE — PHYSICAL EXAM
[General Appearance - Well Developed] : well developed [General Appearance - Well Nourished] : well nourished [Well Groomed] : well groomed [Normal Appearance] : normal appearance [General Appearance - In No Acute Distress] : no acute distress [Abdomen Tenderness] : non-tender [Costovertebral Angle Tenderness] : no ~M costovertebral angle tenderness [Abdomen Soft] : soft [Urinary Bladder Findings] : the bladder was normal on palpation [Edema] : no peripheral edema [] : no respiratory distress [Respiration, Rhythm And Depth] : normal respiratory rhythm and effort [Exaggerated Use Of Accessory Muscles For Inspiration] : no accessory muscle use [Oriented To Time, Place, And Person] : oriented to person, place, and time [Not Anxious] : not anxious [Mood] : the mood was normal [Affect] : the affect was normal [Normal Station and Gait] : the gait and station were normal for the patient's age [No Focal Deficits] : no focal deficits [No Palpable Adenopathy] : no palpable adenopathy [FreeTextEntry1] : 2+ radial pulse BL

## 2020-07-09 NOTE — LETTER BODY
[Dear  ___] : Dear  [unfilled], [Consult Letter:] : I had the pleasure of evaluating your patient, [unfilled]. [Please see my note below.] : Please see my note below. [Sincerely,] : Sincerely, [FreeTextEntry2] : Ralf Mcmullen\par [address] [facility] [facility], [address]\[place] [address] [FreeTextEntry1] : 06/18/2020: 55 year old female presents today for an initial evaluation. Pt has frequent UTIs. Pt's aunt had breast cancer and ovarian cancer. Passed age 32. Had mastectomy. Pt has mood symptoms and may have underlining bipolar discord. Pt's daughter does not have UTIs. PCP, Dr. De advised pt to see urologist about symptoms. PVR today was 9mL. Pt has had two children and one vaginal birth. Denies headache, asthma, chest pains, cough, constipation, vaginal discharge and trouble with hearing. Pt had a partial hysterectomy 8-10 years ago. Vaginal was reconstructed and ovaries were removed. Only burning when she has UTI. No HX of drinking or smoking. No FMHx of seizures or stones. No skin problems. No aspirin or blood thinners. Had menopause and hot flashes have stopped. 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 Pt tested negative for colon and breast cancers. \par \par Pt was advised to increase fluid intake and to have some cranberry juice. \par \par Prescribed Bactrim and Trimethoprim. \par \par Pt will provide a urine sample today for culture, cytology and analysis. Bloodwork today will include CBC, and CMP.\par \par Scheduled pt for a CT scan and cystoscopy for 1-2 weeks.  [FreeTextEntry3] : Ralf Ribeiro MD, PhD\par [address] [place] [address]\[place] [phone]

## 2020-07-09 NOTE — HISTORY OF PRESENT ILLNESS
[FreeTextEntry1] : 06/18/2020: 55 year old female presents today for an initial evaluation. Pt has frequent UTIs. Pt's aunt had breast cancer and ovarian cancer. Passed age 32. Had mastectomy. Pt has mood symptoms and may have underlining bipolar discord. Pt's daughter does not have UTIs. PCP, Dr. De advised pt to see urologist about symptoms. PVR today was 9mL. Pt has had two children both by vaginal birth. Denies headache, asthma, chest pains, cough, constipation, vaginal discharge and trouble with hearing. Pt had a partial hysterectomy 8-10 years ago. Vaginal was reconstructed and ovaries were removed. Only burning when she has UTI. No HX of drinking or smoking. No FMHx of seizures or stones. No skin problems. No aspirin or blood thinners. Had menopause and hot flashes have stopped. 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 Pt tested negative for colon and breast cancers. On seroquel for bipolar disorder.\par \par 07/09/2020: Patient presents today for a cystoscopy.  Retroflex telescope. Bladder neck looks normal. Cystocele present. No bladder stones. Bladder has a large capacity. Both ureteral orifices have clear efflux, is slit and is at the normal position on the trigone. Metaplasia posteriorly on bladder neck and trigone. The inflammatory polyps are numerous, thin and peripheral. No ureteral stricture, or stones. On 06/18/2020 Pt Creatinine level 1.35 mg. Pt on Trimethoprim Qhs. Hx of Hysterectomy 6 years ago. CT Scan today reveals left fibroid that hasn’t been taken out,  no kidney stones found. NKDA.

## 2020-07-12 LAB
APPEARANCE: CLEAR
BILIRUBIN URINE: NEGATIVE
BLOOD URINE: NEGATIVE
COLOR: NORMAL
GLUCOSE QUALITATIVE U: NEGATIVE
KETONES URINE: NEGATIVE
LEUKOCYTE ESTERASE URINE: NEGATIVE
NITRITE URINE: NEGATIVE
PH URINE: 6.5
PROTEIN URINE: NEGATIVE
SPECIFIC GRAVITY URINE: 1.04
URINE CYTOLOGY: NORMAL
UROBILINOGEN URINE: NORMAL

## 2020-07-15 DIAGNOSIS — R19.00 INTRA-ABDOMINAL AND PELVIC SWELLING, MASS AND LUMP, UNSPECIFIED SITE: ICD-10-CM

## 2020-07-15 DIAGNOSIS — K76.9 LIVER DISEASE, UNSPECIFIED: ICD-10-CM

## 2020-07-20 DIAGNOSIS — N39.0 URINARY TRACT INFECTION, SITE NOT SPECIFIED: ICD-10-CM

## 2020-07-20 DIAGNOSIS — E83.52 HYPERCALCEMIA: ICD-10-CM

## 2020-07-27 ENCOUNTER — APPOINTMENT (OUTPATIENT)
Dept: MRI IMAGING | Facility: IMAGING CENTER | Age: 55
End: 2020-07-27

## 2020-07-28 ENCOUNTER — APPOINTMENT (OUTPATIENT)
Dept: UROLOGY | Facility: CLINIC | Age: 55
End: 2020-07-28

## 2020-07-28 ENCOUNTER — APPOINTMENT (OUTPATIENT)
Dept: INTERNAL MEDICINE | Facility: CLINIC | Age: 55
End: 2020-07-28

## 2020-08-09 ENCOUNTER — APPOINTMENT (OUTPATIENT)
Dept: MRI IMAGING | Facility: IMAGING CENTER | Age: 55
End: 2020-08-09

## 2020-08-09 ENCOUNTER — OUTPATIENT (OUTPATIENT)
Dept: OUTPATIENT SERVICES | Facility: HOSPITAL | Age: 55
LOS: 1 days | End: 2020-08-09

## 2020-08-09 DIAGNOSIS — Z90.710 ACQUIRED ABSENCE OF BOTH CERVIX AND UTERUS: Chronic | ICD-10-CM

## 2020-08-09 DIAGNOSIS — K76.9 LIVER DISEASE, UNSPECIFIED: ICD-10-CM

## 2020-08-09 DIAGNOSIS — R19.00 INTRA-ABDOMINAL AND PELVIC SWELLING, MASS AND LUMP, UNSPECIFIED SITE: ICD-10-CM

## 2020-08-09 DIAGNOSIS — Z00.8 ENCOUNTER FOR OTHER GENERAL EXAMINATION: ICD-10-CM

## 2020-08-09 DIAGNOSIS — N39.0 URINARY TRACT INFECTION, SITE NOT SPECIFIED: ICD-10-CM

## 2020-08-10 ENCOUNTER — APPOINTMENT (OUTPATIENT)
Dept: UROLOGY | Facility: CLINIC | Age: 55
End: 2020-08-10

## 2020-08-14 ENCOUNTER — EMERGENCY (EMERGENCY)
Facility: HOSPITAL | Age: 55
LOS: 1 days | Discharge: HOME | End: 2020-08-14
Admitting: EMERGENCY MEDICINE
Payer: COMMERCIAL

## 2020-08-14 VITALS
RESPIRATION RATE: 16 BRPM | TEMPERATURE: 99 F | OXYGEN SATURATION: 99 % | SYSTOLIC BLOOD PRESSURE: 109 MMHG | HEART RATE: 101 BPM | DIASTOLIC BLOOD PRESSURE: 60 MMHG

## 2020-08-14 DIAGNOSIS — Z90.710 ACQUIRED ABSENCE OF BOTH CERVIX AND UTERUS: Chronic | ICD-10-CM

## 2020-08-14 DIAGNOSIS — F31.30 BIPOLAR DISORDER, CURRENT EPISODE DEPRESSED, MILD OR MODERATE SEVERITY, UNSPECIFIED: ICD-10-CM

## 2020-08-14 LAB
ALBUMIN SERPL ELPH-MCNC: 5.4 G/DL — HIGH (ref 3.3–5)
ALP SERPL-CCNC: 78 U/L — SIGNIFICANT CHANGE UP (ref 40–120)
ALT FLD-CCNC: 26 U/L — SIGNIFICANT CHANGE UP (ref 4–33)
AMPHET UR-MCNC: NEGATIVE — SIGNIFICANT CHANGE UP
ANION GAP SERPL CALC-SCNC: 18 MMO/L — HIGH (ref 7–14)
APAP SERPL-MCNC: < 15 UG/ML — LOW (ref 15–25)
APPEARANCE UR: CLEAR — SIGNIFICANT CHANGE UP
AST SERPL-CCNC: 26 U/L — SIGNIFICANT CHANGE UP (ref 4–32)
BARBITURATES UR SCN-MCNC: NEGATIVE — SIGNIFICANT CHANGE UP
BASOPHILS # BLD AUTO: 0.07 K/UL — SIGNIFICANT CHANGE UP (ref 0–0.2)
BASOPHILS NFR BLD AUTO: 0.5 % — SIGNIFICANT CHANGE UP (ref 0–2)
BENZODIAZ UR-MCNC: NEGATIVE — SIGNIFICANT CHANGE UP
BILIRUB SERPL-MCNC: 0.6 MG/DL — SIGNIFICANT CHANGE UP (ref 0.2–1.2)
BILIRUB UR-MCNC: NEGATIVE — SIGNIFICANT CHANGE UP
BLOOD UR QL VISUAL: NEGATIVE — SIGNIFICANT CHANGE UP
BUN SERPL-MCNC: 32 MG/DL — HIGH (ref 7–23)
CALCIUM SERPL-MCNC: 11.2 MG/DL — HIGH (ref 8.4–10.5)
CANNABINOIDS UR-MCNC: NEGATIVE — SIGNIFICANT CHANGE UP
CHLORIDE SERPL-SCNC: 99 MMOL/L — SIGNIFICANT CHANGE UP (ref 98–107)
CO2 SERPL-SCNC: 27 MMOL/L — SIGNIFICANT CHANGE UP (ref 22–31)
COCAINE METAB.OTHER UR-MCNC: NEGATIVE — SIGNIFICANT CHANGE UP
COLOR SPEC: YELLOW — SIGNIFICANT CHANGE UP
CREAT SERPL-MCNC: 1.12 MG/DL — SIGNIFICANT CHANGE UP (ref 0.5–1.3)
EOSINOPHIL # BLD AUTO: 0.09 K/UL — SIGNIFICANT CHANGE UP (ref 0–0.5)
EOSINOPHIL NFR BLD AUTO: 0.6 % — SIGNIFICANT CHANGE UP (ref 0–6)
ETHANOL BLD-MCNC: < 10 MG/DL — SIGNIFICANT CHANGE UP
GLUCOSE SERPL-MCNC: 79 MG/DL — SIGNIFICANT CHANGE UP (ref 70–99)
GLUCOSE UR-MCNC: NEGATIVE — SIGNIFICANT CHANGE UP
HCT VFR BLD CALC: 46.1 % — HIGH (ref 34.5–45)
HGB BLD-MCNC: 14.3 G/DL — SIGNIFICANT CHANGE UP (ref 11.5–15.5)
IMM GRANULOCYTES NFR BLD AUTO: 0.3 % — SIGNIFICANT CHANGE UP (ref 0–1.5)
KETONES UR-MCNC: NEGATIVE — SIGNIFICANT CHANGE UP
LEUKOCYTE ESTERASE UR-ACNC: NEGATIVE — SIGNIFICANT CHANGE UP
LYMPHOCYTES # BLD AUTO: 21.5 % — SIGNIFICANT CHANGE UP (ref 13–44)
LYMPHOCYTES # BLD AUTO: 3.3 K/UL — SIGNIFICANT CHANGE UP (ref 1–3.3)
MCHC RBC-ENTMCNC: 29.2 PG — SIGNIFICANT CHANGE UP (ref 27–34)
MCHC RBC-ENTMCNC: 31 % — LOW (ref 32–36)
MCV RBC AUTO: 94.1 FL — SIGNIFICANT CHANGE UP (ref 80–100)
METHADONE UR-MCNC: NEGATIVE — SIGNIFICANT CHANGE UP
MONOCYTES # BLD AUTO: 1.81 K/UL — HIGH (ref 0–0.9)
MONOCYTES NFR BLD AUTO: 11.8 % — SIGNIFICANT CHANGE UP (ref 2–14)
NEUTROPHILS # BLD AUTO: 10.05 K/UL — HIGH (ref 1.8–7.4)
NEUTROPHILS NFR BLD AUTO: 65.3 % — SIGNIFICANT CHANGE UP (ref 43–77)
NITRITE UR-MCNC: NEGATIVE — SIGNIFICANT CHANGE UP
NRBC # FLD: 0 K/UL — SIGNIFICANT CHANGE UP (ref 0–0)
OPIATES UR-MCNC: NEGATIVE — SIGNIFICANT CHANGE UP
OXYCODONE UR-MCNC: NEGATIVE — SIGNIFICANT CHANGE UP
PCP UR-MCNC: NEGATIVE — SIGNIFICANT CHANGE UP
PH UR: 5.5 — SIGNIFICANT CHANGE UP (ref 5–8)
PLATELET # BLD AUTO: 443 K/UL — HIGH (ref 150–400)
PMV BLD: 9.2 FL — SIGNIFICANT CHANGE UP (ref 7–13)
POTASSIUM SERPL-MCNC: 3.8 MMOL/L — SIGNIFICANT CHANGE UP (ref 3.5–5.3)
POTASSIUM SERPL-SCNC: 3.8 MMOL/L — SIGNIFICANT CHANGE UP (ref 3.5–5.3)
PROT SERPL-MCNC: 7.9 G/DL — SIGNIFICANT CHANGE UP (ref 6–8.3)
PROT UR-MCNC: 10 — SIGNIFICANT CHANGE UP
RBC # BLD: 4.9 M/UL — SIGNIFICANT CHANGE UP (ref 3.8–5.2)
RBC # FLD: 12.7 % — SIGNIFICANT CHANGE UP (ref 10.3–14.5)
SALICYLATES SERPL-MCNC: < 5 MG/DL — LOW (ref 15–30)
SARS-COV-2 RNA SPEC QL NAA+PROBE: SIGNIFICANT CHANGE UP
SODIUM SERPL-SCNC: 144 MMOL/L — SIGNIFICANT CHANGE UP (ref 135–145)
SP GR SPEC: 1.03 — SIGNIFICANT CHANGE UP (ref 1–1.04)
TSH SERPL-MCNC: 0.25 UIU/ML — LOW (ref 0.27–4.2)
UROBILINOGEN FLD QL: NORMAL — SIGNIFICANT CHANGE UP
WBC # BLD: 15.37 K/UL — HIGH (ref 3.8–10.5)
WBC # FLD AUTO: 15.37 K/UL — HIGH (ref 3.8–10.5)

## 2020-08-14 PROCEDURE — 90792 PSYCH DIAG EVAL W/MED SRVCS: CPT

## 2020-08-14 PROCEDURE — 93010 ELECTROCARDIOGRAM REPORT: CPT

## 2020-08-14 PROCEDURE — 99283 EMERGENCY DEPT VISIT LOW MDM: CPT | Mod: 25

## 2020-08-14 RX ORDER — QUETIAPINE FUMARATE 200 MG/1
1 TABLET, FILM COATED ORAL
Qty: 5 | Refills: 0
Start: 2020-08-14 | End: 2020-08-18

## 2020-08-14 NOTE — ED BEHAVIORAL HEALTH ASSESSMENT NOTE - HPI (INCLUDE ILLNESS QUALITY, SEVERITY, DURATION, TIMING, CONTEXT, MODIFYING FACTORS, ASSOCIATED SIGNS AND SYMPTOMS)
55 year old  female living with ex- and two children, daughter & father moving out this weekend, prior psychiatric history of mood disorder, recurrent depression, one manic episode 1.5 years ago, history of UTI related psychosis in 2016, short courses of outpatient treatment, admitted medically 2 months ago for UTI related mood change, last saw a therapist at [facility] September 2019 while on Lexapro, recent COVID+ March 2020, was brought in by family for wandering in street, looking to connect patient to care, denying SI/HI/tina/psychosis in setting of pending divorce and noncompliance.     See  note for detailed history from daughter who is an RN at [employer].    On evaluation, patient is calm, cooperative, compliant with all requests, and coherently gives a history. states she is here b/c daughter found her in street confused. States she got scared that she was going to be alone as she is getting a divorce and her  is moving out this weekend with her daughter Luann. States she in the past had manic episode and was on Seroquel but stopped the medication. Now is on Lexapro 10 mg for depression and is compliant. States manic episode was due to UTI. Reports confusion was bizarre but that she feels ok now and does not want to come to hospital. When offered inpatient hospitalization for depression, patient reports she is not suicidal, homicidal, is not hearing voices, and is eating and sleeping, and does not understand why she needs to come in. At this time, not meeting criteria for involuntary hospitalization.    There have been no acute changes in her mood and she denies acute psychiatric complaints. Patient denies SI/HI/I/P. Patient reports feeling mildly depressed due to daughter & ex  moving out, denies helplessness or hopelessness, denies anhedonia, denies change in appetite or energy level, denies problem with sleep, denies thoughts of harming self or others. Patient denies symptoms of tina, denies symptoms of anxiety or panic attacks, denies symptoms of OCD. Patient denies hearing voices or seeing things that others cannot hear or see. Patient denies previous trauma, denies physical or sexual abuse, denies PTSD-related symptoms.    Collateral information: Per Behavioural health note by SARBJIT. Daughter reports there is no acute safety concern, but that she is worried her mother will wander in street as she is not there this weekend. States she is ok with  for med mgt and therapy. In meantime will send 5 day supply of Seroquel 50 mg q HS, daughter will distribute it q HS. Did well on Seroquel 100 mg in past. No reports of suicide or aggression. Family and patient agreeable to  referral. She offers no impediment in taking patient back at home. Patient and family in accord of the treatment planning.

## 2020-08-14 NOTE — ED PROVIDER NOTE - CLINICAL SUMMARY MEDICAL DECISION MAKING FREE TEXT BOX
Medical evaluation performed. There is no clinical evidence of intoxication or any acute medical problem requiring immediate intervention. Patient is awaiting psychiatric consultation. Final disposition will be determined by psychiatrist. 54 y/o F hx DM, HTN, HLD, Anxiety, Depression, Psychosis     Labs, Urine Tox/UA, EKG    Medical evaluation performed. There is no clinical evidence of intoxication or any acute medical problem requiring immediate intervention. Patient is awaiting psychiatric consultation. Final disposition will be determined by psychiatrist.

## 2020-08-14 NOTE — ED PROVIDER NOTE - NSFOLLOWUPCLINICS_GEN_ALL_ED_FT
[facility]  Behavioral Health  [address] [place] [address]  Phone: [phone]  Fax:   Follow Up Time:

## 2020-08-14 NOTE — ED ADULT NURSE NOTE - NSIMPLEMENTINTERV_GEN_ALL_ED
Implemented All Universal Safety Interventions:  [place] to call system. Call bell, personal items and telephone within reach. Instruct patient to call for assistance. Room bathroom lighting operational. Non-slip footwear when patient is off stretcher. Physically safe environment: no spills, clutter or unnecessary equipment. Stretcher in lowest position, wheels locked, appropriate side rails in place.

## 2020-08-14 NOTE — ED BEHAVIORAL HEALTH NOTE - BEHAVIORAL HEALTH NOTE
Writer contacted and spoke with pt’s sister, Luann, at [phone]. Pt’s sister provided the following information:     Pt domiciled with 29 year old son and 25 year old daughter. Pt recently  from spouse x 1 month. Pt has no work hx in last 20 years. Pt has hx of Major Depressive Disorder/Bipolar Disorder. No past direct psychiatric admissions. Daughter reports pt medically admitted and followed by psychiatry 2x in past. Last hospitalization end of March early April at [facility] for 6 days with UTI and psychosis explained to be manic like behavior (increased spending, taking large amounts of money out of the bank, and trying to go to [place]). Daughter reports symptoms resolved after treatment. Pt had therapist in past. No current treatment. Pt has had past med trials of Seroquel 50mg (d/geovanni 2 months ago) and Lexapro 10mg (stopped 1 year ago). Daughter reports that pt flushed medications down the toilet.  Daughter dropped pt off today. Daughter states pt psychiatrically decompensating for the last 1-2 weeks after [facility] discharge. Daughter reports stressors related to pt moving out of house, getting  from spouse, and illness of mother. Daughter came home to house today and pt was said to be up the block wandering. Pt talking to a random person. Pt was saying things like am I dead and referring to her  father. Pt oversleeping and staying in bed longer. Pt lying around ruminating. Pt eating very little in the last week. Pt is showering and completing tasks like laundry. No Si/HI intent or plan. No past attempts. No AH or VH. Daughter reports paranoia about daughter leaving and if she will be okay. Daughter reports son (29 years old) will stay living with her with spouse also potentially living there although . Daughter expresses concern for chance that pt will wander again. Daughter denies pt having ever done this before. Daughter reports past tina but fears that current symptoms related to depression. Pt independent at baseline. Daughter reports pt with confusion/memory impairment when overwhelmed. No known exposure to COVID. No recent travel outside of [place]. No violence or threatening behaviors. Writer contacted and spoke with pt’s daughter, Luann Vu, at [phone]. Pt’s sister provided the following information:     Pt domiciled with 29 year old son and 25 year old daughter. Pt recently  from spouse x 1 month. Pt has no work hx in last 20 years. Pt has hx of Major Depressive Disorder/Bipolar Disorder. No past direct psychiatric admissions. Daughter reports pt medically admitted and followed by psychiatry 2x in past. Last hospitalization end of March early April at [facility] for 6 days with UTI and psychosis explained to be manic like behavior (increased spending, taking large amounts of money out of the bank, and trying to go to [place]). Daughter reports symptoms resolved after treatment. Pt had therapist in past. No current treatment. Pt has had past med trials of Seroquel 50mg (d/geovanni 2 months ago) and Lexapro 10mg (stopped 1 year ago). Daughter reports that pt flushed medications down the toilet.  Daughter dropped pt off today. Daughter states pt psychiatrically decompensating for the last 1-2 weeks after [facility] discharge. Daughter reports stressors related to pt moving out of house, getting  from spouse, and illness of mother. Daughter came home to house today and pt was said to be up the block wandering. Pt talking to a random person. Pt was saying things like am I dead and referring to her  father. Pt oversleeping and staying in bed longer. Pt lying around ruminating. Pt eating very little in the last week. Pt is showering and completing tasks like laundry. No Si/HI intent or plan. No past attempts. No AH or VH. Daughter reports paranoia about daughter leaving and if she will be okay. Daughter reports son (29 years old) will stay living with her with spouse also potentially living there although . Daughter expresses concern for chance that pt will wander again. Daughter denies pt having ever done this before. Daughter reports past tina but fears that current symptoms related to depression. Pt independent at baseline. Daughter reports pt with confusion/memory impairment when overwhelmed. No known exposure to COVID. No recent travel outside of [place]. No violence or threatening behaviors.

## 2020-08-14 NOTE — ED PROVIDER NOTE - CARE PLAN
Principal Discharge DX:	Bipolar affective disorder, current episode depressed, current episode severity unspecified

## 2020-08-14 NOTE — ED ADULT NURSE NOTE - OBJECTIVE STATEMENT
Pt is a 54yo female,  walk in triage, brought in for c/o depression and bizarre behavior. Pt was reported to be wandering in the streets confused and speaking to someone she believes was her father. Denies SI, HI, and auditory and visual hallucinations. Denies any recent alcohol and drug use. Ongoing psychiatric evaluation in progress.

## 2020-08-14 NOTE — ED PROVIDER NOTE - PMH
Diabetes    SHANNON (generalized anxiety disorder)    Hyperlipemia    Hypertension    MDD (major depressive disorder)    Organic psychosis

## 2020-08-14 NOTE — ED BEHAVIORAL HEALTH ASSESSMENT NOTE - OTHER PAST PSYCHIATRIC HISTORY (INCLUDE DETAILS REGARDING ONSET, COURSE OF ILLNESS, INPATIENT/OUTPATIENT TREATMENT)
History of recurrent depression, SHANNON, hx one manic episode 1.5 years ago, no psychiatric hospitalization, hx UTI-related psychosis in 2016, prior to which had no psychiatric history. Was last in outpatient treatment over the summer at [facility] stopped in September, had been taking Lexapro up until 1 month ago

## 2020-08-14 NOTE — ED BEHAVIORAL HEALTH ASSESSMENT NOTE - SUMMARY
55 year old  female living with ex- and two children, daughter & father moving out this weekend, prior psychiatric history of mood disorder, recurrent depression, one manic episode 1.5 years ago, history of UTI related psychosis in 2016, short courses of outpatient treatment, admitted medically 2 months ago for UTI related mood change, last saw a therapist at [facility] September 2019 while on Lexapro, recent COVID+ March 2020, was brought in by family for wandering in street, looking to connect patient to care, denying SI/HI/tina/psychosis in setting of pending divorce and noncompliance.     1. Upon medical clearance (pt has family hx of Alzheimers and has mild memory impairment), please discharge home with family  2. Patient is not presenting as an imminent risk for harm to self, and does not meet criteria for involuntary in-patient hospitalization. Patient and mother agreeable to discharge plan, and engaged in safety planning. Patient to follow-up with out-patient provider via  referral.  3. Sent 5 day supply of Seroquel 50 mg q HS as patient did well on this in past, prior to care follow up
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
## 2020-08-14 NOTE — ED BEHAVIORAL HEALTH ASSESSMENT NOTE - VIOLENCE RISK FACTORS:
Impulsivity/Noncompliance with treatment/Irritability/Elopement history or risk/Community stressors that increase the risk of destabilization/Affective dysregulation/Lack of insight into violence risk/need for treatment

## 2020-08-14 NOTE — ED PROVIDER NOTE - OBJECTIVE STATEMENT
54 y/o F hx DM, HTN, HLD, Anxiety, Depression, Psychosis   BIB  w c/o depression. Daughter states that patient has been acting strange and is pacing and taking to strangers in the  streets.   Patient admits to feeling sad because of a divorce.   Denies SI/HI/AH/VH.  Denies falling, punching or kicking any objects. Denies pain, SOB,  ever, chills, chest/ abdominal discomfort.  Denies use of alcohol or illicit drugs. No evidence of physical injuries , broken skin or deformities.

## 2020-08-14 NOTE — ED BEHAVIORAL HEALTH ASSESSMENT NOTE - CURRENT MEDICATION
lipitor 10mg po qhs, metformin 500mg po daily, valsartan 25mg po daily, amlodipine 2.5mg po daily, lexapro 10 mg

## 2020-08-14 NOTE — ED BEHAVIORAL HEALTH ASSESSMENT NOTE - SUICIDE PROTECTIVE FACTORS
Identifies reasons for living/Ability to cope with stress/Responsibility to family and others/Supportive social network of family or friends/Has future plans/Fear of death or the actual act of killing self

## 2020-08-14 NOTE — ED BEHAVIORAL HEALTH NOTE - BEHAVIORAL HEALTH NOTE
Writer met with pt on unit to discuss  referral. Writer inquired if pt was interested in following up at [facility]. Pt declined reporting interest in clinic closer to home. Pt was not aware of location she would prefer. Pt then provided verbal consent for appointment discussion with identified location and all further contact to be discussed with daughter directly. Writer then contacted daughter at [phone]. Writer discussed options with daughter. Daughter requesting appointment be scheduled at [facility] [address] [place] [address]  Main Tel: [phone]/Intake Tel: [phone]. Writer contacted both numbers listed receiving voicemail. No fax number found. Pt added to  log. SW to follow up. Daughter to be contacted with appointment information when available.

## 2020-08-14 NOTE — ED BEHAVIORAL HEALTH ASSESSMENT NOTE - DESCRIPTION
Patient was calm and cooperative in the ED and did not exhibit any aggression. Pt did not require any prn medications or any physical restraints.    Vital Signs Last 24 Hrs  T(C): 37.1 (14 Aug 2020 16:05), Max: 37.1 (14 Aug 2020 16:05)  T(F): 98.7 (14 Aug 2020 16:05), Max: 98.7 (14 Aug 2020 16:05)  HR: 101 (14 Aug 2020 16:05) (101 - 101)  BP: 109/60 (14 Aug 2020 16:05) (109/60 - 109/60)  BP(mean): --  RR: 16 (14 Aug 2020 16:05) (16 - 16)  SpO2: 99% (14 Aug 2020 16:05) (99% - 99%) HLD, HTN, pre-diabetes,osteopenia, [place], grew up in [place], graduated high school, some college,  thirty years, has one son and one daughter

## 2020-08-14 NOTE — ED ADULT NURSE REASSESSMENT NOTE - NS ED NURSE REASSESS COMMENT FT1
pt calm denies si/hi/avh presently pt d/c by NP resources provided pt transported home by daughter pt verbalizing understanding of d/c instructions.

## 2020-08-14 NOTE — ED PROVIDER NOTE - FAMILY HISTORY
FH: myocardial infarction, Father,  at age 55     Mother  Still living? Unknown  FH: Alzheimers disease, Age at diagnosis: Age Unknown

## 2020-08-14 NOTE — ED BEHAVIORAL HEALTH ASSESSMENT NOTE - RISK ASSESSMENT
Low Acute Suicide Risk risk factors: mood episode, hx of tina, discontinuation of medication, pending divorce  Chronic risk: prior psych hx, non-compliance/not in treatment, lacks insight, poor judgment  Protective: social supports, no prior history of suicide attempts or violence, no legal history, no substance abuse, lives with son, female gender, no suicidal ideation or HI

## 2020-08-15 LAB
SARS-COV-2 IGG SERPL QL IA: POSITIVE
SARS-COV-2 IGM SERPL IA-ACNC: 72.3 AU/ML — HIGH

## 2020-08-16 NOTE — ED POST DISCHARGE NOTE - REASON FOR FOLLOW-UP
Other COVID-19 AB Positive. COVID-19 not detected. Patient contact # [phone] S/W patient  and patient's daughter and discussed with them results.

## 2020-08-17 NOTE — ED BEHAVIORAL HEALTH NOTE - BEHAVIORAL HEALTH NOTE
Urgent referral log:  Worker called [facility] Tel: [phone] and spoke to Aura who states currently all clinics is utilizing tele health services. She states that the only clinic accepting patients affiliated with [facility] is [address] [place] [address]. Worker called patient’s daughter Luann #[phone] to discuss plan. Daughter states she would prefer to have patient go to [facility] ao. Worker emailed [facility].

## 2020-08-18 NOTE — ED BEHAVIORAL HEALTH NOTE - BEHAVIORAL HEALTH NOTE
Urgent Referral Log:  received an email from [facility] informing all [facility] appointments should be sent to [facility] and scheduled through the google calendar.  Rosaurar called pt's daughter Luann [phone] who states she has already scheduled an appointment for patient with a private provider for Monday 8/24.  She was informed she can use the Crisis clinic as a bridge if needed until her appointment on 8/24.

## 2020-09-05 ENCOUNTER — TRANSCRIPTION ENCOUNTER (OUTPATIENT)
Age: 55
End: 2020-09-05

## 2020-12-23 PROBLEM — Z87.440 HISTORY OF URINARY TRACT INFECTION: Status: RESOLVED | Noted: 2020-06-18 | Resolved: 2020-12-23

## 2022-05-03 ENCOUNTER — APPOINTMENT (OUTPATIENT)
Dept: MAMMOGRAPHY | Facility: IMAGING CENTER | Age: 57
End: 2022-05-03
Payer: COMMERCIAL

## 2022-05-03 ENCOUNTER — OUTPATIENT (OUTPATIENT)
Dept: OUTPATIENT SERVICES | Facility: HOSPITAL | Age: 57
LOS: 1 days | End: 2022-05-03
Payer: COMMERCIAL

## 2022-05-03 DIAGNOSIS — Z00.8 ENCOUNTER FOR OTHER GENERAL EXAMINATION: ICD-10-CM

## 2022-05-03 DIAGNOSIS — Z90.710 ACQUIRED ABSENCE OF BOTH CERVIX AND UTERUS: Chronic | ICD-10-CM

## 2022-05-03 PROCEDURE — 77067 SCR MAMMO BI INCL CAD: CPT

## 2022-05-03 PROCEDURE — 77067 SCR MAMMO BI INCL CAD: CPT | Mod: 26

## 2022-05-03 PROCEDURE — 77063 BREAST TOMOSYNTHESIS BI: CPT | Mod: 26

## 2022-05-03 PROCEDURE — 77063 BREAST TOMOSYNTHESIS BI: CPT

## 2023-10-03 NOTE — PROGRESS NOTE BEHAVIORAL HEALTH - NSBHATTESTSEENBY_PSY_A_CORE
Abdominal cramping
Attending Psychiatrist supervising NP/Trainee, meeting pt...

## 2024-03-02 NOTE — PROGRESS NOTE BEHAVIORAL HEALTH - NS ED [facility] MED ROS GASTROINTESTINAL
3/1/24: POD#1 s/p repeat LTCS.  -doing well post-op  -encouraged ambulation  -hand hygiene and wound care reviewed  -continue routine post-op care    3/2/24: POD#2 s/p scheduled RLTCD  -routine postpartum care  -anticipate d/c home today  
No complaints

## 2024-03-06 NOTE — PROGRESS NOTE BEHAVIORAL HEALTH - NS ED BHA MED ROS CONSTITUTIONAL SYMPTOMS
AUTHORIZATION FOR RELEASE OF   CONFIDENTIAL INFORMATION      We are seeing Elena Gill, date of birth 1962, in the clinic at [facility]. Elver Lagos MD is the patient's PCP. Elena Gill has an outstanding lab/procedure at the time we reviewed her chart. In order to help keep her health information updated, she has authorized us to request the following medical record(s):     85-   (  )  MAMMOGRAM                                      (  )  COLONOSCOPY      (  )  PAP SMEAR                                          (  )  OUTSIDE LAB RESULTS     (  )  DEXA SCAN                                          (  )  EYE EXAM       2nd request     (  )  FOOT EXAM                                          (  )  ENTIRE RECORD     (  )  OUTSIDE IMMUNIZATIONS                 (  )  _______________         Please fax records to [facility], [place], Andre D., MD,[phone]      Patient Name: Elena Gill  : 1962  Patient Phone #: [phone]     
No complaints

## 2024-08-13 NOTE — ED ADULT TRIAGE NOTE - CHIEF COMPLAINT QUOTE
Pt w/ hx of htn hld DM2 bipolar disorder depression noncompliant with medications and dealing with changes daughter moving out, and in the middle of a divorce brought in by daughter who found her wandering the streets confused and talking to her father who passed away. Pt denies SI HI hallucinations.      Luann Vu (daughter) [phone] Telephone Call    Called patient to convey results     -Normal x-ray however recommend PT, referral sent  -A1c worsening 8.3%, recommend increase jardiance to 25mg qd    F/U 3 months    Patient understood above and had no further questions/concerns.    Lan San M.D.  Family Medicine

## 2024-09-30 ENCOUNTER — RESULT REVIEW (OUTPATIENT)
Age: 59
End: 2024-09-30

## 2024-10-01 ENCOUNTER — TRANSCRIPTION ENCOUNTER (OUTPATIENT)
Age: 59
End: 2024-10-01

## 2024-10-02 ENCOUNTER — TRANSCRIPTION ENCOUNTER (OUTPATIENT)
Age: 59
End: 2024-10-02

## 2024-10-02 ENCOUNTER — NON-APPOINTMENT (OUTPATIENT)
Age: 59
End: 2024-10-02

## 2024-11-08 ENCOUNTER — APPOINTMENT (OUTPATIENT)
Dept: NEUROLOGY | Facility: CLINIC | Age: 59
End: 2024-11-08
Payer: COMMERCIAL

## 2024-11-08 VITALS
BODY MASS INDEX: 35.88 KG/M2 | SYSTOLIC BLOOD PRESSURE: 118 MMHG | HEART RATE: 83 BPM | HEIGHT: 59 IN | DIASTOLIC BLOOD PRESSURE: 73 MMHG | WEIGHT: 178 LBS

## 2024-11-08 DIAGNOSIS — R56.9 UNSPECIFIED CONVULSIONS: ICD-10-CM

## 2024-11-08 PROCEDURE — 99203 OFFICE O/P NEW LOW 30 MIN: CPT

## 2024-11-08 PROCEDURE — G2211 COMPLEX E/M VISIT ADD ON: CPT

## 2024-11-08 RX ORDER — PERAMPANEL 4 MG/1
4 TABLET ORAL
Qty: 90 | Refills: 1 | Status: ACTIVE | COMMUNITY
Start: 2024-11-08 | End: 1900-01-01

## 2024-11-08 RX ORDER — LACOSAMIDE 150 MG/1
150 TABLET ORAL
Qty: 180 | Refills: 1 | Status: ACTIVE | COMMUNITY
Start: 2024-11-08 | End: 1900-01-01

## 2024-11-08 RX ORDER — BRIVARACETAM 100 MG/1
100 TABLET, FILM COATED ORAL
Qty: 180 | Refills: 1 | Status: ACTIVE | COMMUNITY
Start: 2024-11-08 | End: 1900-01-01

## 2025-01-21 ENCOUNTER — TRANSCRIPTION ENCOUNTER (OUTPATIENT)
Age: 60
End: 2025-01-21

## 2025-02-10 ENCOUNTER — APPOINTMENT (OUTPATIENT)
Dept: NEUROLOGY | Facility: CLINIC | Age: 60
End: 2025-02-10

## 2025-02-10 VITALS
DIASTOLIC BLOOD PRESSURE: 74 MMHG | HEIGHT: 59 IN | HEART RATE: 88 BPM | WEIGHT: 180 LBS | BODY MASS INDEX: 36.29 KG/M2 | SYSTOLIC BLOOD PRESSURE: 135 MMHG

## 2025-02-10 DIAGNOSIS — G40.919 EPILEPSY, UNSPECIFIED, INTRACTABLE, W/OUT STATUS EPILEPTICUS: ICD-10-CM

## 2025-02-10 PROCEDURE — 99215 OFFICE O/P EST HI 40 MIN: CPT

## 2025-02-24 ENCOUNTER — APPOINTMENT (OUTPATIENT)
Dept: NEUROLOGY | Facility: CLINIC | Age: 60
End: 2025-02-24
Payer: COMMERCIAL

## 2025-02-24 PROCEDURE — 95816 EEG AWAKE AND DROWSY: CPT

## 2025-02-25 PROCEDURE — 95708 EEG WO VID EA 12-26HR UNMNTR: CPT

## 2025-02-25 PROCEDURE — 95700 EEG CONT REC W/VID EEG TECH: CPT

## 2025-02-25 PROCEDURE — 95719 EEG PHYS/QHP EA INCR W/O VID: CPT

## 2025-04-12 ENCOUNTER — NON-APPOINTMENT (OUTPATIENT)
Age: 60
End: 2025-04-12

## 2025-04-14 ENCOUNTER — APPOINTMENT (OUTPATIENT)
Dept: NEUROLOGY | Facility: CLINIC | Age: 60
End: 2025-04-14
Payer: COMMERCIAL

## 2025-04-14 VITALS
BODY MASS INDEX: 36.29 KG/M2 | HEART RATE: 94 BPM | WEIGHT: 180 LBS | SYSTOLIC BLOOD PRESSURE: 108 MMHG | HEIGHT: 59 IN | DIASTOLIC BLOOD PRESSURE: 66 MMHG

## 2025-04-14 DIAGNOSIS — R19.00 INTRA-ABDOMINAL AND PELVIC SWELLING, MASS AND LUMP, UNSPECIFIED SITE: ICD-10-CM

## 2025-04-14 DIAGNOSIS — G40.919 EPILEPSY, UNSPECIFIED, INTRACTABLE, W/OUT STATUS EPILEPTICUS: ICD-10-CM

## 2025-04-14 PROCEDURE — 99214 OFFICE O/P EST MOD 30 MIN: CPT
